# Patient Record
Sex: FEMALE | Race: WHITE | NOT HISPANIC OR LATINO
[De-identification: names, ages, dates, MRNs, and addresses within clinical notes are randomized per-mention and may not be internally consistent; named-entity substitution may affect disease eponyms.]

---

## 2017-01-06 ENCOUNTER — APPOINTMENT (OUTPATIENT)
Dept: PSYCHIATRY | Facility: CLINIC | Age: 27
End: 2017-01-06

## 2017-05-16 ENCOUNTER — RESULT REVIEW (OUTPATIENT)
Age: 27
End: 2017-05-16

## 2017-09-21 ENCOUNTER — APPOINTMENT (OUTPATIENT)
Dept: PSYCHIATRY | Facility: CLINIC | Age: 27
End: 2017-09-21
Payer: MEDICAID

## 2017-09-21 PROCEDURE — 90834 PSYTX W PT 45 MINUTES: CPT

## 2017-09-23 ENCOUNTER — APPOINTMENT (OUTPATIENT)
Dept: PSYCHIATRY | Facility: CLINIC | Age: 27
End: 2017-09-23
Payer: MEDICAID

## 2017-09-23 PROCEDURE — 90853 GROUP PSYCHOTHERAPY: CPT

## 2017-09-28 ENCOUNTER — APPOINTMENT (OUTPATIENT)
Dept: PSYCHIATRY | Facility: CLINIC | Age: 27
End: 2017-09-28
Payer: MEDICAID

## 2017-09-28 PROCEDURE — 90834 PSYTX W PT 45 MINUTES: CPT

## 2017-09-30 ENCOUNTER — APPOINTMENT (OUTPATIENT)
Dept: PSYCHIATRY | Facility: CLINIC | Age: 27
End: 2017-09-30
Payer: MEDICAID

## 2017-09-30 PROCEDURE — 90853 GROUP PSYCHOTHERAPY: CPT

## 2017-10-05 ENCOUNTER — APPOINTMENT (OUTPATIENT)
Dept: PSYCHIATRY | Facility: CLINIC | Age: 27
End: 2017-10-05
Payer: MEDICAID

## 2017-10-05 PROCEDURE — 90834 PSYTX W PT 45 MINUTES: CPT

## 2017-10-07 ENCOUNTER — APPOINTMENT (OUTPATIENT)
Dept: PSYCHIATRY | Facility: CLINIC | Age: 27
End: 2017-10-07
Payer: MEDICAID

## 2017-10-07 PROCEDURE — 90853 GROUP PSYCHOTHERAPY: CPT

## 2017-10-12 ENCOUNTER — APPOINTMENT (OUTPATIENT)
Dept: PSYCHIATRY | Facility: CLINIC | Age: 27
End: 2017-10-12
Payer: MEDICAID

## 2017-10-12 PROCEDURE — 90834 PSYTX W PT 45 MINUTES: CPT

## 2017-10-14 ENCOUNTER — APPOINTMENT (OUTPATIENT)
Dept: PSYCHIATRY | Facility: CLINIC | Age: 27
End: 2017-10-14
Payer: MEDICAID

## 2017-10-14 PROCEDURE — 90853 GROUP PSYCHOTHERAPY: CPT

## 2017-10-19 ENCOUNTER — APPOINTMENT (OUTPATIENT)
Dept: PSYCHIATRY | Facility: CLINIC | Age: 27
End: 2017-10-19

## 2017-10-24 ENCOUNTER — APPOINTMENT (OUTPATIENT)
Dept: PSYCHIATRY | Facility: CLINIC | Age: 27
End: 2017-10-24
Payer: COMMERCIAL

## 2017-10-24 PROCEDURE — 90834 PSYTX W PT 45 MINUTES: CPT

## 2017-10-28 ENCOUNTER — APPOINTMENT (OUTPATIENT)
Dept: PSYCHIATRY | Facility: CLINIC | Age: 27
End: 2017-10-28
Payer: COMMERCIAL

## 2017-10-28 PROCEDURE — 90853 GROUP PSYCHOTHERAPY: CPT

## 2017-11-04 ENCOUNTER — APPOINTMENT (OUTPATIENT)
Dept: PSYCHIATRY | Facility: CLINIC | Age: 27
End: 2017-11-04
Payer: COMMERCIAL

## 2017-11-04 PROCEDURE — 90853 GROUP PSYCHOTHERAPY: CPT

## 2017-11-04 PROCEDURE — 90834 PSYTX W PT 45 MINUTES: CPT | Mod: 59

## 2017-11-09 ENCOUNTER — APPOINTMENT (OUTPATIENT)
Dept: PSYCHIATRY | Facility: CLINIC | Age: 27
End: 2017-11-09
Payer: COMMERCIAL

## 2017-11-09 PROCEDURE — 90834 PSYTX W PT 45 MINUTES: CPT

## 2017-11-11 ENCOUNTER — APPOINTMENT (OUTPATIENT)
Dept: PSYCHIATRY | Facility: CLINIC | Age: 27
End: 2017-11-11
Payer: COMMERCIAL

## 2017-11-11 PROCEDURE — 90853 GROUP PSYCHOTHERAPY: CPT

## 2017-11-16 ENCOUNTER — APPOINTMENT (OUTPATIENT)
Dept: PSYCHIATRY | Facility: CLINIC | Age: 27
End: 2017-11-16
Payer: COMMERCIAL

## 2017-11-16 PROCEDURE — 90834 PSYTX W PT 45 MINUTES: CPT

## 2017-11-21 ENCOUNTER — APPOINTMENT (OUTPATIENT)
Dept: PSYCHIATRY | Facility: CLINIC | Age: 27
End: 2017-11-21
Payer: COMMERCIAL

## 2017-11-21 PROCEDURE — 90834 PSYTX W PT 45 MINUTES: CPT

## 2017-11-25 ENCOUNTER — APPOINTMENT (OUTPATIENT)
Dept: PSYCHIATRY | Facility: CLINIC | Age: 27
End: 2017-11-25
Payer: COMMERCIAL

## 2017-11-25 PROCEDURE — 90853 GROUP PSYCHOTHERAPY: CPT

## 2017-11-30 ENCOUNTER — APPOINTMENT (OUTPATIENT)
Dept: PSYCHIATRY | Facility: CLINIC | Age: 27
End: 2017-11-30
Payer: COMMERCIAL

## 2017-11-30 PROCEDURE — 90834 PSYTX W PT 45 MINUTES: CPT

## 2017-12-02 ENCOUNTER — APPOINTMENT (OUTPATIENT)
Dept: PSYCHIATRY | Facility: CLINIC | Age: 27
End: 2017-12-02
Payer: COMMERCIAL

## 2017-12-02 PROCEDURE — 90853 GROUP PSYCHOTHERAPY: CPT

## 2017-12-09 ENCOUNTER — APPOINTMENT (OUTPATIENT)
Dept: PSYCHIATRY | Facility: CLINIC | Age: 27
End: 2017-12-09
Payer: COMMERCIAL

## 2017-12-09 PROCEDURE — 90853 GROUP PSYCHOTHERAPY: CPT

## 2017-12-09 PROCEDURE — 90834 PSYTX W PT 45 MINUTES: CPT | Mod: 59

## 2017-12-14 ENCOUNTER — APPOINTMENT (OUTPATIENT)
Dept: PSYCHIATRY | Facility: CLINIC | Age: 27
End: 2017-12-14
Payer: COMMERCIAL

## 2017-12-14 PROCEDURE — 90834 PSYTX W PT 45 MINUTES: CPT

## 2017-12-23 ENCOUNTER — APPOINTMENT (OUTPATIENT)
Dept: PSYCHIATRY | Facility: CLINIC | Age: 27
End: 2017-12-23
Payer: MEDICAID

## 2017-12-23 ENCOUNTER — APPOINTMENT (OUTPATIENT)
Dept: PSYCHIATRY | Facility: CLINIC | Age: 27
End: 2017-12-23
Payer: COMMERCIAL

## 2017-12-23 PROCEDURE — 90853 GROUP PSYCHOTHERAPY: CPT

## 2017-12-23 PROCEDURE — 90834 PSYTX W PT 45 MINUTES: CPT

## 2017-12-30 ENCOUNTER — APPOINTMENT (OUTPATIENT)
Dept: PSYCHIATRY | Facility: CLINIC | Age: 27
End: 2017-12-30
Payer: COMMERCIAL

## 2017-12-30 PROCEDURE — 90853 GROUP PSYCHOTHERAPY: CPT

## 2017-12-30 PROCEDURE — 90834 PSYTX W PT 45 MINUTES: CPT | Mod: 59

## 2018-01-06 ENCOUNTER — APPOINTMENT (OUTPATIENT)
Dept: PSYCHIATRY | Facility: CLINIC | Age: 28
End: 2018-01-06
Payer: COMMERCIAL

## 2018-01-06 PROCEDURE — 90834 PSYTX W PT 45 MINUTES: CPT | Mod: 59

## 2018-01-06 PROCEDURE — 90853 GROUP PSYCHOTHERAPY: CPT

## 2018-01-13 ENCOUNTER — APPOINTMENT (OUTPATIENT)
Dept: PSYCHIATRY | Facility: CLINIC | Age: 28
End: 2018-01-13
Payer: COMMERCIAL

## 2018-01-13 PROCEDURE — 90834 PSYTX W PT 45 MINUTES: CPT

## 2018-01-20 ENCOUNTER — APPOINTMENT (OUTPATIENT)
Dept: PSYCHIATRY | Facility: CLINIC | Age: 28
End: 2018-01-20
Payer: COMMERCIAL

## 2018-01-20 PROCEDURE — 90853 GROUP PSYCHOTHERAPY: CPT

## 2018-01-27 ENCOUNTER — APPOINTMENT (OUTPATIENT)
Dept: PSYCHIATRY | Facility: CLINIC | Age: 28
End: 2018-01-27
Payer: COMMERCIAL

## 2018-01-27 PROCEDURE — 90853 GROUP PSYCHOTHERAPY: CPT

## 2018-01-27 PROCEDURE — 90834 PSYTX W PT 45 MINUTES: CPT | Mod: 59

## 2018-02-01 ENCOUNTER — APPOINTMENT (OUTPATIENT)
Dept: PSYCHIATRY | Facility: CLINIC | Age: 28
End: 2018-02-01
Payer: COMMERCIAL

## 2018-02-01 PROCEDURE — 90834 PSYTX W PT 45 MINUTES: CPT

## 2018-02-08 ENCOUNTER — APPOINTMENT (OUTPATIENT)
Dept: PSYCHIATRY | Facility: CLINIC | Age: 28
End: 2018-02-08
Payer: COMMERCIAL

## 2018-02-08 PROCEDURE — 90834 PSYTX W PT 45 MINUTES: CPT

## 2018-02-10 ENCOUNTER — APPOINTMENT (OUTPATIENT)
Dept: PSYCHIATRY | Facility: CLINIC | Age: 28
End: 2018-02-10
Payer: COMMERCIAL

## 2018-02-10 PROCEDURE — 90853 GROUP PSYCHOTHERAPY: CPT

## 2018-02-17 ENCOUNTER — APPOINTMENT (OUTPATIENT)
Dept: PSYCHIATRY | Facility: CLINIC | Age: 28
End: 2018-02-17
Payer: COMMERCIAL

## 2018-02-17 PROCEDURE — 90853 GROUP PSYCHOTHERAPY: CPT

## 2018-02-17 PROCEDURE — 90834 PSYTX W PT 45 MINUTES: CPT | Mod: 59

## 2018-02-24 ENCOUNTER — APPOINTMENT (OUTPATIENT)
Dept: PSYCHIATRY | Facility: CLINIC | Age: 28
End: 2018-02-24
Payer: COMMERCIAL

## 2018-02-24 PROCEDURE — 90837 PSYTX W PT 60 MINUTES: CPT | Mod: 59

## 2018-02-24 PROCEDURE — 90853 GROUP PSYCHOTHERAPY: CPT

## 2018-03-03 ENCOUNTER — APPOINTMENT (OUTPATIENT)
Dept: PSYCHIATRY | Facility: CLINIC | Age: 28
End: 2018-03-03
Payer: COMMERCIAL

## 2018-03-03 PROCEDURE — 90837 PSYTX W PT 60 MINUTES: CPT

## 2018-03-03 PROCEDURE — 90853 GROUP PSYCHOTHERAPY: CPT

## 2018-03-03 PROCEDURE — 90837 PSYTX W PT 60 MINUTES: CPT | Mod: 59

## 2018-03-17 ENCOUNTER — APPOINTMENT (OUTPATIENT)
Dept: PSYCHIATRY | Facility: CLINIC | Age: 28
End: 2018-03-17
Payer: COMMERCIAL

## 2018-03-17 PROCEDURE — 90834 PSYTX W PT 45 MINUTES: CPT | Mod: 59

## 2018-03-17 PROCEDURE — 90853 GROUP PSYCHOTHERAPY: CPT

## 2018-03-24 ENCOUNTER — APPOINTMENT (OUTPATIENT)
Dept: PSYCHIATRY | Facility: CLINIC | Age: 28
End: 2018-03-24
Payer: COMMERCIAL

## 2018-03-24 PROCEDURE — 90834 PSYTX W PT 45 MINUTES: CPT

## 2018-03-31 ENCOUNTER — APPOINTMENT (OUTPATIENT)
Dept: PSYCHIATRY | Facility: CLINIC | Age: 28
End: 2018-03-31
Payer: COMMERCIAL

## 2018-03-31 PROCEDURE — 90853 GROUP PSYCHOTHERAPY: CPT

## 2018-03-31 PROCEDURE — 90837 PSYTX W PT 60 MINUTES: CPT | Mod: 59

## 2018-04-10 ENCOUNTER — APPOINTMENT (OUTPATIENT)
Dept: PSYCHIATRY | Facility: CLINIC | Age: 28
End: 2018-04-10
Payer: COMMERCIAL

## 2018-04-10 PROCEDURE — 90837 PSYTX W PT 60 MINUTES: CPT

## 2018-04-18 ENCOUNTER — APPOINTMENT (OUTPATIENT)
Dept: PSYCHIATRY | Facility: CLINIC | Age: 28
End: 2018-04-18

## 2018-04-25 ENCOUNTER — APPOINTMENT (OUTPATIENT)
Dept: PSYCHIATRY | Facility: CLINIC | Age: 28
End: 2018-04-25
Payer: COMMERCIAL

## 2018-04-25 PROCEDURE — 90834 PSYTX W PT 45 MINUTES: CPT

## 2018-05-09 ENCOUNTER — APPOINTMENT (OUTPATIENT)
Dept: PSYCHIATRY | Facility: CLINIC | Age: 28
End: 2018-05-09
Payer: COMMERCIAL

## 2018-05-09 PROCEDURE — 90834 PSYTX W PT 45 MINUTES: CPT

## 2018-05-12 ENCOUNTER — APPOINTMENT (OUTPATIENT)
Dept: PSYCHIATRY | Facility: CLINIC | Age: 28
End: 2018-05-12
Payer: COMMERCIAL

## 2018-05-12 PROCEDURE — 90853 GROUP PSYCHOTHERAPY: CPT

## 2018-05-14 ENCOUNTER — EMERGENCY (EMERGENCY)
Facility: HOSPITAL | Age: 28
LOS: 1 days | End: 2018-05-14
Payer: MEDICAID

## 2018-05-14 PROCEDURE — 99284 EMERGENCY DEPT VISIT MOD MDM: CPT | Mod: 25

## 2018-05-19 ENCOUNTER — APPOINTMENT (OUTPATIENT)
Dept: PSYCHIATRY | Facility: CLINIC | Age: 28
End: 2018-05-19
Payer: COMMERCIAL

## 2018-05-19 PROCEDURE — 90853 GROUP PSYCHOTHERAPY: CPT

## 2018-06-06 ENCOUNTER — RESULT REVIEW (OUTPATIENT)
Age: 28
End: 2018-06-06

## 2018-06-23 ENCOUNTER — APPOINTMENT (OUTPATIENT)
Dept: PSYCHIATRY | Facility: CLINIC | Age: 28
End: 2018-06-23

## 2018-06-26 ENCOUNTER — APPOINTMENT (OUTPATIENT)
Dept: PSYCHIATRY | Facility: CLINIC | Age: 28
End: 2018-06-26
Payer: COMMERCIAL

## 2018-06-26 PROCEDURE — 90837 PSYTX W PT 60 MINUTES: CPT

## 2018-07-03 ENCOUNTER — APPOINTMENT (OUTPATIENT)
Dept: PSYCHIATRY | Facility: CLINIC | Age: 28
End: 2018-07-03
Payer: COMMERCIAL

## 2018-07-03 PROCEDURE — 90837 PSYTX W PT 60 MINUTES: CPT

## 2018-07-14 ENCOUNTER — APPOINTMENT (OUTPATIENT)
Dept: PSYCHIATRY | Facility: CLINIC | Age: 28
End: 2018-07-14
Payer: COMMERCIAL

## 2018-07-14 PROCEDURE — 90837 PSYTX W PT 60 MINUTES: CPT

## 2018-07-31 ENCOUNTER — APPOINTMENT (OUTPATIENT)
Dept: PSYCHIATRY | Facility: CLINIC | Age: 28
End: 2018-07-31
Payer: COMMERCIAL

## 2018-07-31 PROCEDURE — 90837 PSYTX W PT 60 MINUTES: CPT

## 2018-09-01 ENCOUNTER — APPOINTMENT (OUTPATIENT)
Dept: PSYCHIATRY | Facility: CLINIC | Age: 28
End: 2018-09-01
Payer: COMMERCIAL

## 2018-09-01 PROCEDURE — 90837 PSYTX W PT 60 MINUTES: CPT

## 2018-09-04 ENCOUNTER — APPOINTMENT (OUTPATIENT)
Dept: PSYCHIATRY | Facility: CLINIC | Age: 28
End: 2018-09-04
Payer: COMMERCIAL

## 2018-09-04 PROCEDURE — 90837 PSYTX W PT 60 MINUTES: CPT

## 2018-09-18 ENCOUNTER — APPOINTMENT (OUTPATIENT)
Dept: PSYCHIATRY | Facility: CLINIC | Age: 28
End: 2018-09-18

## 2018-10-02 ENCOUNTER — APPOINTMENT (OUTPATIENT)
Dept: PSYCHIATRY | Facility: CLINIC | Age: 28
End: 2018-10-02
Payer: COMMERCIAL

## 2018-10-02 PROCEDURE — 90837 PSYTX W PT 60 MINUTES: CPT

## 2018-10-10 ENCOUNTER — APPOINTMENT (OUTPATIENT)
Dept: PSYCHIATRY | Facility: CLINIC | Age: 28
End: 2018-10-10

## 2018-10-10 ENCOUNTER — APPOINTMENT (OUTPATIENT)
Dept: PSYCHIATRY | Facility: CLINIC | Age: 28
End: 2018-10-10
Payer: COMMERCIAL

## 2018-10-10 PROCEDURE — 90832 PSYTX W PT 30 MINUTES: CPT

## 2018-10-13 ENCOUNTER — APPOINTMENT (OUTPATIENT)
Dept: PSYCHIATRY | Facility: CLINIC | Age: 28
End: 2018-10-13
Payer: COMMERCIAL

## 2018-10-13 PROCEDURE — 90837 PSYTX W PT 60 MINUTES: CPT

## 2018-10-20 ENCOUNTER — APPOINTMENT (OUTPATIENT)
Dept: PSYCHIATRY | Facility: CLINIC | Age: 28
End: 2018-10-20

## 2018-10-23 ENCOUNTER — APPOINTMENT (OUTPATIENT)
Dept: PSYCHIATRY | Facility: CLINIC | Age: 28
End: 2018-10-23
Payer: COMMERCIAL

## 2018-10-23 ENCOUNTER — APPOINTMENT (OUTPATIENT)
Dept: ENDOCRINOLOGY | Facility: CLINIC | Age: 28
End: 2018-10-23
Payer: MEDICAID

## 2018-10-23 VITALS
BODY MASS INDEX: 25.52 KG/M2 | DIASTOLIC BLOOD PRESSURE: 62 MMHG | HEART RATE: 82 BPM | WEIGHT: 144 LBS | HEIGHT: 63 IN | SYSTOLIC BLOOD PRESSURE: 102 MMHG | OXYGEN SATURATION: 98 %

## 2018-10-23 DIAGNOSIS — R63.5 ABNORMAL WEIGHT GAIN: ICD-10-CM

## 2018-10-23 DIAGNOSIS — Z82.49 FAMILY HISTORY OF ISCHEMIC HEART DISEASE AND OTHER DISEASES OF THE CIRCULATORY SYSTEM: ICD-10-CM

## 2018-10-23 DIAGNOSIS — Z87.09 PERSONAL HISTORY OF OTHER DISEASES OF THE RESPIRATORY SYSTEM: ICD-10-CM

## 2018-10-23 DIAGNOSIS — L70.9 ACNE, UNSPECIFIED: ICD-10-CM

## 2018-10-23 PROCEDURE — 99204 OFFICE O/P NEW MOD 45 MIN: CPT

## 2018-10-23 PROCEDURE — 90837 PSYTX W PT 60 MINUTES: CPT

## 2018-10-24 ENCOUNTER — APPOINTMENT (OUTPATIENT)
Dept: PSYCHIATRY | Facility: CLINIC | Age: 28
End: 2018-10-24

## 2018-11-16 ENCOUNTER — APPOINTMENT (OUTPATIENT)
Dept: PSYCHIATRY | Facility: CLINIC | Age: 28
End: 2018-11-16
Payer: COMMERCIAL

## 2018-11-16 PROCEDURE — 90837 PSYTX W PT 60 MINUTES: CPT

## 2018-11-28 ENCOUNTER — APPOINTMENT (OUTPATIENT)
Dept: PSYCHIATRY | Facility: CLINIC | Age: 28
End: 2018-11-28
Payer: COMMERCIAL

## 2018-11-28 PROCEDURE — 90837 PSYTX W PT 60 MINUTES: CPT

## 2018-12-15 ENCOUNTER — APPOINTMENT (OUTPATIENT)
Dept: PSYCHIATRY | Facility: CLINIC | Age: 28
End: 2018-12-15

## 2019-01-16 ENCOUNTER — APPOINTMENT (OUTPATIENT)
Dept: PSYCHIATRY | Facility: CLINIC | Age: 29
End: 2019-01-16
Payer: COMMERCIAL

## 2019-01-16 PROCEDURE — 90837 PSYTX W PT 60 MINUTES: CPT

## 2019-01-18 ENCOUNTER — APPOINTMENT (OUTPATIENT)
Dept: RHEUMATOLOGY | Facility: CLINIC | Age: 29
End: 2019-01-18

## 2019-01-21 ENCOUNTER — APPOINTMENT (OUTPATIENT)
Dept: CARDIOLOGY | Facility: CLINIC | Age: 29
End: 2019-01-21
Payer: COMMERCIAL

## 2019-01-21 ENCOUNTER — NON-APPOINTMENT (OUTPATIENT)
Age: 29
End: 2019-01-21

## 2019-01-21 VITALS
WEIGHT: 139 LBS | HEART RATE: 83 BPM | DIASTOLIC BLOOD PRESSURE: 81 MMHG | SYSTOLIC BLOOD PRESSURE: 118 MMHG | RESPIRATION RATE: 16 BRPM | HEIGHT: 63 IN | BODY MASS INDEX: 24.63 KG/M2

## 2019-01-21 DIAGNOSIS — R06.09 OTHER FORMS OF DYSPNEA: ICD-10-CM

## 2019-01-21 DIAGNOSIS — Z78.9 OTHER SPECIFIED HEALTH STATUS: ICD-10-CM

## 2019-01-21 DIAGNOSIS — Z87.898 PERSONAL HISTORY OF OTHER SPECIFIED CONDITIONS: ICD-10-CM

## 2019-01-21 PROCEDURE — 93000 ELECTROCARDIOGRAM COMPLETE: CPT

## 2019-01-21 PROCEDURE — 99244 OFF/OP CNSLTJ NEW/EST MOD 40: CPT

## 2019-01-21 RX ORDER — DEXAMETHASONE 1 MG/1
1 TABLET ORAL
Qty: 1 | Refills: 0 | Status: DISCONTINUED | COMMUNITY
Start: 2018-10-23 | End: 2019-01-21

## 2019-01-21 NOTE — DISCUSSION/SUMMARY
[FreeTextEntry1] : 1. Echocardiogram and 30 day event monitor to rule out cardiac causes including arrhythmia as cause of her syncope, presyncope and dizziness.\par 2. If these are unremarkable no additional cardiac testing or treatment at this time.\par 3. If testing is unremarkable there would be no cardiac contraindication to any surgery if this was planned or other intervention for the adenoma.\par 4. Will obtain most recent bloodwork.\par 5. Follow up here in six weeks.

## 2019-01-21 NOTE — ASSESSMENT
[FreeTextEntry1] : EKG: Sinus rhythm with no significant ST or T wave changes.\par \par 28-year-old female with no significant past medical history who was recently diagnosed with a pituitary adenoma and presents to me for evaluation of syncope, presyncope and dizziness. It is most likely that her symptoms of syncope, presyncope and dizziness all related to the adenoma. A cardiac cause would appear less likely. At this time I would rule this out however with an echocardiogram and monitoring to ensure that there is no evidence of arrhythmia. Her dyspnea on exertion may be related to the change in weather and I would certainly doubt a cardiac cause of that if her echocardiogram is unremarkable.

## 2019-01-21 NOTE — HISTORY OF PRESENT ILLNESS
[FreeTextEntry1] : The patient comes to the office today primarily because of episodes of syncope and near-syncope. The episodes of syncope occurred in the middle of last year, last in May of 2018. She reports continued episodes with near syncope, most recently a week ago, occurring generally every 2 weeks. She has frequent if not constant dizziness. She also reports more recently some mild shortness of breath on exertion and is wondering if this is related to her prior asthma and the change in weather. She notes she occasionally gets a sensation of rushing in her chest but no clear palpitations. She was diagnosed with possibly a pituitary microadenoma as a potential cause of her symptoms with regards to dizziness and syncope. Patient denies chest pain, shortness of breath, orthopnea.

## 2019-01-25 ENCOUNTER — APPOINTMENT (OUTPATIENT)
Dept: PSYCHIATRY | Facility: CLINIC | Age: 29
End: 2019-01-25
Payer: COMMERCIAL

## 2019-01-25 PROCEDURE — 90837 PSYTX W PT 60 MINUTES: CPT

## 2019-02-01 ENCOUNTER — APPOINTMENT (OUTPATIENT)
Dept: PSYCHIATRY | Facility: CLINIC | Age: 29
End: 2019-02-01
Payer: COMMERCIAL

## 2019-02-01 PROCEDURE — 90847 FAMILY PSYTX W/PT 50 MIN: CPT

## 2019-02-06 ENCOUNTER — APPOINTMENT (OUTPATIENT)
Dept: CARDIOLOGY | Facility: CLINIC | Age: 29
End: 2019-02-06

## 2019-02-19 ENCOUNTER — APPOINTMENT (OUTPATIENT)
Dept: PSYCHIATRY | Facility: CLINIC | Age: 29
End: 2019-02-19

## 2019-03-05 ENCOUNTER — LABORATORY RESULT (OUTPATIENT)
Age: 29
End: 2019-03-05

## 2019-03-05 ENCOUNTER — APPOINTMENT (OUTPATIENT)
Dept: PSYCHIATRY | Facility: CLINIC | Age: 29
End: 2019-03-05
Payer: COMMERCIAL

## 2019-03-05 ENCOUNTER — APPOINTMENT (OUTPATIENT)
Dept: RHEUMATOLOGY | Facility: CLINIC | Age: 29
End: 2019-03-05
Payer: COMMERCIAL

## 2019-03-05 VITALS
OXYGEN SATURATION: 99 % | HEART RATE: 86 BPM | HEIGHT: 62.5 IN | WEIGHT: 130 LBS | SYSTOLIC BLOOD PRESSURE: 104 MMHG | RESPIRATION RATE: 17 BRPM | DIASTOLIC BLOOD PRESSURE: 70 MMHG | BODY MASS INDEX: 23.33 KG/M2 | TEMPERATURE: 98.1 F

## 2019-03-05 DIAGNOSIS — Z86.39 PERSONAL HISTORY OF OTHER ENDOCRINE, NUTRITIONAL AND METABOLIC DISEASE: ICD-10-CM

## 2019-03-05 PROCEDURE — 36415 COLL VENOUS BLD VENIPUNCTURE: CPT

## 2019-03-05 PROCEDURE — 99245 OFF/OP CONSLTJ NEW/EST HI 55: CPT | Mod: 25

## 2019-03-05 PROCEDURE — 90837 PSYTX W PT 60 MINUTES: CPT

## 2019-03-05 NOTE — ASSESSMENT
[FreeTextEntry1] : 28 year old female was referred for abnormal serologies, including (+)centromere AB and (+)proteinase-3 AB.  She does not exhibit any obvious signs/symptoms of scleroderma/CREST, ANCA-vasculitis, or other connective tissue disorders at this time.  I have therefore ordered some more bloodwork as further workup.  In addition, as she has been experiencing dyspnea, I have ordered a chest x-ray and recommended that she see a pulmonologist.  She will also follow up with her gastroenterologist regarding her dysphagia.  She will follow up with me again in 3-4 weeks  to review her results.\par

## 2019-03-05 NOTE — PHYSICAL EXAM
[General Appearance - Alert] : alert [General Appearance - In No Acute Distress] : in no acute distress [Sclera] : the sclera and conjunctiva were normal [Outer Ear] : the ears and nose were normal in appearance [Oropharynx] : the oropharynx was normal [Neck Appearance] : the appearance of the neck was normal [Neck Cervical Mass (___cm)] : no neck mass was observed [Jugular Venous Distention Increased] : there was no jugular-venous distention [Thyroid Diffuse Enlargement] : the thyroid was not enlarged [Thyroid Nodule] : there were no palpable thyroid nodules [Auscultation Breath Sounds / Voice Sounds] : lungs were clear to auscultation bilaterally [Heart Rate And Rhythm] : heart rate was normal and rhythm regular [Heart Sounds] : normal S1 and S2 [Heart Sounds Gallop] : no gallops [Murmurs] : no murmurs [Heart Sounds Pericardial Friction Rub] : no pericardial rub [Edema] : there was no peripheral edema [Bowel Sounds] : normal bowel sounds [Abdomen Soft] : soft [Abdomen Tenderness] : non-tender [Abdomen Mass (___ Cm)] : no abdominal mass palpated [Cervical Lymph Nodes Enlarged Posterior Bilaterally] : posterior cervical [Cervical Lymph Nodes Enlarged Anterior Bilaterally] : anterior cervical [Supraclavicular Lymph Nodes Enlarged Bilaterally] : supraclavicular [No Spinal Tenderness] : no spinal tenderness [FreeTextEntry1] : No synovitis, full ROM in all joints\par  [Skin Color & Pigmentation] : normal skin color and pigmentation [Skin Turgor] : normal skin turgor [] : no rash [No Focal Deficits] : no focal deficits [Oriented To Time, Place, And Person] : oriented to person, place, and time [Impaired Insight] : insight and judgment were intact [Affect] : the affect was normal

## 2019-03-05 NOTE — HISTORY OF PRESENT ILLNESS
[FreeTextEntry1] : 28 year old female with PMHx as listed below reports that in 10/2017, she began to feel off-balance.  Several months later, she began to experience syncopal episodes, which was attributed to episodes of hypotension. In 6/2018, she began to experience lactation, which was diagnosed as a pituitary adenoma.  She was sent for a brain MRI, which revealed the adenoma, as well as other "hyper-resonant" areas.  She followed up with her PMD, who ordered bloodwork, revealing (+)MICK and (+)centromere AB.  For the past several months, she has been experiencing dyspnea.  She says it's worse with exertion, but occurs at times even at rest.  It occurs approximately once per week, lasting for several hours at a time. She also reports that she has been experiencing difficulty swallowing pills - she is able to swallow food, but has to chew it up very well.  \par She denies any sclerodactyly or Raynaud's symptoms.\par No F/C, (+) weight loss, (+) night sweats, no oral ulcers, no rashes, no joint pains, no alopecia, no photosensitivity, no dry eyes/dry mouth, no focal weakness, no dysphagia\par \par   [Anorexia] : no anorexia [Weight Loss] : no weight loss [Malaise] : no malaise [Fever] : no fever [Chills] : no chills [Fatigue] : no fatigue [Malar Facial Rash] : no malar facial rash [Skin Lesions] : no lesions [Skin Nodules] : no skin nodules [Oral Ulcers] : no oral ulcers [Cough] : no cough [Dry Mouth] : no dry mouth [Dysphagia] : no dysphagia [Shortness of Breath] : no shortness of breath [Chest Pain] : no chest pain [Arthralgias] : no arthralgias [Joint Swelling] : no joint swelling [Joint Warmth] : no joint warmth [Joint Deformity] : no joint deformity [Decreased ROM] : no decreased range of motion [Morning Stiffness] : no morning stiffness [Falls] : no falls [Difficulty Standing] : no difficulty standing [Difficulty Walking] : no difficulty walking [Dyspnea] : no dyspnea [Myalgias] : no myalgias [Muscle Weakness] : no muscle weakness [Muscle Spasms] : no muscle spasms [Muscle Cramping] : no muscle cramping [Visual Changes] : no visual changes [Eye Pain] : no eye pain [Eye Redness] : no eye redness [Dry Eyes] : no dry eyes

## 2019-03-05 NOTE — CONSULT LETTER
[Dear  ___] : Dear  [unfilled], [Consult Letter:] : I had the pleasure of evaluating your patient, [unfilled]. [Please see my note below.] : Please see my note below. [Consult Closing:] : Thank you very much for allowing me to participate in the care of this patient.  If you have any questions, please do not hesitate to contact me. [Sincerely,] : Sincerely, [FreeTextEntry3] : Maikol Shearer MD\par Rheumatology\par [facility]\par  of Medicine\par [employer] [facility] \par \par [address]\[place] [address]\par phone:  [phone]\par fax:      [phone]\par \par [address] [place] [address]\[place] [address]\par phone:  [phone]\par fax:      [phone]\par

## 2019-03-06 LAB
ALBUMIN SERPL ELPH-MCNC: 4.7 G/DL
ALP BLD-CCNC: 42 U/L
ALT SERPL-CCNC: 14 U/L
ANION GAP SERPL CALC-SCNC: 14 MMOL/L
AST SERPL-CCNC: 17 U/L
BASOPHILS # BLD AUTO: 0.05 K/UL
BASOPHILS NFR BLD AUTO: 0.7 %
BILIRUB SERPL-MCNC: 1 MG/DL
BUN SERPL-MCNC: 6 MG/DL
C3 SERPL-MCNC: 114 MG/DL
C4 SERPL-MCNC: 18 MG/DL
CALCIUM SERPL-MCNC: 10.1 MG/DL
CENTROMERE IGG SER-ACNC: >8 CD:130001892
CHLORIDE SERPL-SCNC: 103 MMOL/L
CHOLEST SERPL-MCNC: 175 MG/DL
CHOLEST/HDLC SERPL: 3.1 RATIO
CO2 SERPL-SCNC: 25 MMOL/L
CREAT SERPL-MCNC: 0.74 MG/DL
CREAT SPEC-SCNC: 345 MG/DL
CREAT/PROT UR: 0.1 RATIO
CRP SERPL-MCNC: <0.1 MG/DL
ENA RNP AB SER IA-ACNC: <0.2 AL
ENA SCL70 IGG SER IA-ACNC: <0.2 AL
ENA SM AB SER IA-ACNC: <0.2 AL
ENA SS-A AB SER IA-ACNC: <0.2 AL
ENA SS-B AB SER IA-ACNC: <0.2 AL
EOSINOPHIL # BLD AUTO: 0.13 K/UL
EOSINOPHIL NFR BLD AUTO: 1.9 %
ERYTHROCYTE [SEDIMENTATION RATE] IN BLOOD BY WESTERGREN METHOD: 9 MM/HR
GLUCOSE SERPL-MCNC: 80 MG/DL
HCT VFR BLD CALC: 46.4 %
HDLC SERPL-MCNC: 57 MG/DL
HGB BLD-MCNC: 14.6 G/DL
IMM GRANULOCYTES NFR BLD AUTO: 0.1 %
LDLC SERPL CALC-MCNC: 100 MG/DL
LYMPHOCYTES # BLD AUTO: 2.02 K/UL
LYMPHOCYTES NFR BLD AUTO: 29.8 %
MAN DIFF?: NORMAL
MCHC RBC-ENTMCNC: 29 PG
MCHC RBC-ENTMCNC: 31.5 GM/DL
MCV RBC AUTO: 92.1 FL
MONOCYTES # BLD AUTO: 0.38 K/UL
MONOCYTES NFR BLD AUTO: 5.6 %
MPO AB + PR3 PNL SER: NORMAL
NEUTROPHILS # BLD AUTO: 4.18 K/UL
NEUTROPHILS NFR BLD AUTO: 61.9 %
PLATELET # BLD AUTO: 271 K/UL
POTASSIUM SERPL-SCNC: 4.5 MMOL/L
PROT SERPL-MCNC: 7.1 G/DL
PROT UR-MCNC: 19 MG/DL
RBC # BLD: 5.04 M/UL
RBC # FLD: 12.3 %
SODIUM SERPL-SCNC: 142 MMOL/L
TRIGL SERPL-MCNC: 89 MG/DL
WBC # FLD AUTO: 6.77 K/UL

## 2019-03-08 LAB
ANA PAT FLD IF-IMP: ABNORMAL
ANA SER IF-ACNC: ABNORMAL
APPEARANCE: ABNORMAL
BACTERIA: ABNORMAL
BILIRUBIN URINE: NEGATIVE
BLOOD URINE: NEGATIVE
COLOR: ABNORMAL
DSDNA AB SER-ACNC: <12 IU/ML
GLUCOSE QUALITATIVE U: NEGATIVE
HBA1C MFR BLD HPLC: 4.9 %
HYALINE CASTS: 0 /LPF
KETONES URINE: NORMAL
LEUKOCYTE ESTERASE URINE: NEGATIVE
MICROSCOPIC-UA: NORMAL
MYELOPEROXIDASE AB SER QL IA: <5 UNITS
MYELOPEROXIDASE CELLS FLD QL: NEGATIVE
NITRITE URINE: NEGATIVE
PH URINE: 5.5
PROTEIN URINE: NORMAL
PROTEINASE3 AB SER IA-ACNC: 5.2 UNITS
PROTEINASE3 AB SER-ACNC: NEGATIVE
RED BLOOD CELLS URINE: 0 /HPF
SPECIFIC GRAVITY URINE: 1.03
SQUAMOUS EPITHELIAL CELLS: 4 /HPF
THYROGLOB AB SERPL-ACNC: <20 IU/ML
THYROPEROXIDASE AB SERPL IA-ACNC: <10 IU/ML
URINE COMMENTS: NORMAL
UROBILINOGEN URINE: NORMAL
WHITE BLOOD CELLS URINE: 7 /HPF

## 2019-03-11 ENCOUNTER — RESULT REVIEW (OUTPATIENT)
Age: 29
End: 2019-03-11

## 2019-03-11 LAB — RNA POLYMERASE III IGG: <10 U

## 2019-03-13 ENCOUNTER — APPOINTMENT (OUTPATIENT)
Dept: PSYCHIATRY | Facility: CLINIC | Age: 29
End: 2019-03-13
Payer: COMMERCIAL

## 2019-03-13 ENCOUNTER — APPOINTMENT (OUTPATIENT)
Dept: CARDIOLOGY | Facility: CLINIC | Age: 29
End: 2019-03-13
Payer: COMMERCIAL

## 2019-03-13 PROCEDURE — 90837 PSYTX W PT 60 MINUTES: CPT

## 2019-03-13 PROCEDURE — 93306 TTE W/DOPPLER COMPLETE: CPT

## 2019-03-20 ENCOUNTER — TRANSCRIPTION ENCOUNTER (OUTPATIENT)
Age: 29
End: 2019-03-20

## 2019-03-20 ENCOUNTER — APPOINTMENT (OUTPATIENT)
Dept: RHEUMATOLOGY | Facility: CLINIC | Age: 29
End: 2019-03-20
Payer: COMMERCIAL

## 2019-03-20 ENCOUNTER — APPOINTMENT (OUTPATIENT)
Dept: RHEUMATOLOGY | Facility: CLINIC | Age: 29
End: 2019-03-20

## 2019-03-20 VITALS
DIASTOLIC BLOOD PRESSURE: 77 MMHG | TEMPERATURE: 98.1 F | HEIGHT: 62.5 IN | HEART RATE: 80 BPM | SYSTOLIC BLOOD PRESSURE: 118 MMHG | BODY MASS INDEX: 22.97 KG/M2 | WEIGHT: 128 LBS | OXYGEN SATURATION: 99 %

## 2019-03-20 PROCEDURE — 99215 OFFICE O/P EST HI 40 MIN: CPT

## 2019-03-20 NOTE — DATA REVIEWED
[FreeTextEntry1] : Labs reviewed with patient. \par \par TTE (03/2019): reviewed in [facility] \par MRI Brain (10/2018): reviewed in [facility]\par \par

## 2019-03-20 NOTE — ASSESSMENT
[FreeTextEntry1] : 28 year old female here for second opinion for abnormal serologies - positive MICK 1:2560 centromere pattern and positive centromere AB (>8) and transiently (+)proteinase-3 AB.  \par \par 1. Positive centromere antibody: c/o dysphagias and exertional dyspnea which is currently being addressed. Referred for GI w/u including EGD as well as PFTs to evaluate her SOB. Otherwise denies any skin changes, Raynaud's. arthralgias etc. No clinical signs related to scleroderma at this time  i.e. telangiectasia, digital ulcers, NFC changes, synovitis, sclerodactyly. ECHO reviewed - was relatively normal with preserved function. Pending Holter results. Reassured patient that she does not have signs or symptoms suggestive of systemic sclerosis at this time. However, will need to complete baseline evaluation given her symptoms and will need close monitoring given high titer antibodies.   \par \par 2. Positive PR3 antibodies: subsequently negative. Initially was positive in low titers - likely artifact/false positive. No signs or symptoms of vasculitis at this time. \par \par 3. Dysphagia: a/w chronic diarrhea. Advised dietary modification. F/u GI to complete work up - may need EGD and/ or capsule endoscopy to evaluate her bloody diarrhea since colonoscopy was recently normal per patient. \par \par 4. Dizziness: referred for ENT evaluation. ? POTS versus orthostatic hypotension given h/o orthostatic symptoms a/w tachycardia in the 140s. F/u cardiology re: holter results. May also need neuro eval if ENT evaluation is normal. \par \par 5. Exertional dyspnea: referred for PFTs. If abnormal, will need CT chest and pulmonology evaluation.\par \par F/u with Dr. Shearer as scheduled. \par

## 2019-03-20 NOTE — REVIEW OF SYSTEMS
[Negative] : Heme/Lymph [Shortness Of Breath] : shortness of breath [SOB on Exertion] : shortness of breath during exertion [As Noted in HPI] : as noted in HPI [Diarrhea] : diarrhea [Wheezing] : no wheezing [Cough] : no cough [Abdominal Pain] : no abdominal pain [Constipation] : no constipation [Vomiting] : no vomiting [Heartburn] : no heartburn [Melena] : no melena

## 2019-03-20 NOTE — HISTORY OF PRESENT ILLNESS
[___ Week(s) Ago] : [unfilled] week(s) ago [Currently Experiencing] : currently [Anorexia] : no anorexia [Weight Loss] : no weight loss [Malaise] : no malaise [Chills] : no chills [Fever] : no fever [Fatigue] : no fatigue [Depression] : no depression [Malar Facial Rash] : no malar facial rash [Skin Lesions] : no lesions [Skin Nodules] : no skin nodules [Cough] : no cough [Oral Ulcers] : no oral ulcers [Dry Mouth] : no dry mouth [Dysphagia] : no dysphagia [Dysphonia] : no dysphonia [Shortness of Breath] : no shortness of breath [Chest Pain] : no chest pain [Arthralgias] : no arthralgias [Joint Warmth] : no joint warmth [Joint Swelling] : no joint swelling [Joint Deformity] : no joint deformity [Decreased ROM] : no decreased range of motion [Morning Stiffness] : no morning stiffness [Falls] : no falls [Difficulty Standing] : no difficulty standing [Difficulty Walking] : no difficulty walking [Dyspnea] : no dyspnea [Myalgias] : no myalgias [Muscle Weakness] : no muscle weakness [Muscle Cramping] : no muscle cramping [Muscle Spasms] : no muscle spasms [Visual Changes] : no visual changes [Eye Pain] : no eye pain [Dry Eyes] : no dry eyes [Eye Redness] : no eye redness [FreeTextEntry1] : Patient here for a second opinion. \par \par C/o vasovagal episodes in May and in 2018 noticed some lactation episodes. Since then has not passed out but does get orthostatic dizziness. The SOB episodes started appr. 1 year ago - initially with exertion and now sometimes at rest. Has measured her pulse oximeter which is usually normal through these episodes. C/o intermittent chest pains sporadically with these episodes - usually L sided radiating into the substernal and back martir lasting appr. 30 mins to 4 hours. No pleuritic symptoms or relationship to food. ECHO was relatively normal. Wearing a Holter monitor. Has yet to see a pulmonologist or done PFTs. \par \par C/o problems with swallowing pills as well as feeling of solids getting stuck in the chest when she doesn't chew well enough. Has had diarrhea for many years - tested for lactose intolerance and celiac which were negative. Occasionally noticed blood in her stools as well. Had a colonoscopy recently which was normal. Notices that modifying diet does help a little with her symptoms. \par \par Denies any Raynaud's symptoms, oral or nasal ulcers, joint pain or swelling, paresthesias, sinus inflammation, headaches, visual symptoms. No rashes, digital ulcers or telangiectasia. \par \par Has yet to follow up with Endocrine to discuss management of pituitary adenoma. \par \par Ob/Gyn H: A0. Menstrual cycles are irregular in the past 1 year \par

## 2019-03-20 NOTE — PHYSICAL EXAM
[General Appearance - Alert] : alert [General Appearance - In No Acute Distress] : in no acute distress [Sclera] : the sclera and conjunctiva were normal [Oropharynx] : the oropharynx was normal [Outer Ear] : the ears and nose were normal in appearance [Neck Cervical Mass (___cm)] : no neck mass was observed [Neck Appearance] : the appearance of the neck was normal [Jugular Venous Distention Increased] : there was no jugular-venous distention [Thyroid Diffuse Enlargement] : the thyroid was not enlarged [Thyroid Nodule] : there were no palpable thyroid nodules [Auscultation Breath Sounds / Voice Sounds] : lungs were clear to auscultation bilaterally [Heart Rate And Rhythm] : heart rate was normal and rhythm regular [Heart Sounds] : normal S1 and S2 [Heart Sounds Gallop] : no gallops [Murmurs] : no murmurs [Heart Sounds Pericardial Friction Rub] : no pericardial rub [Bowel Sounds] : normal bowel sounds [Edema] : there was no peripheral edema [Abdomen Tenderness] : non-tender [Abdomen Soft] : soft [Abdomen Mass (___ Cm)] : no abdominal mass palpated [Cervical Lymph Nodes Enlarged Anterior Bilaterally] : anterior cervical [Cervical Lymph Nodes Enlarged Posterior Bilaterally] : posterior cervical [Supraclavicular Lymph Nodes Enlarged Bilaterally] : supraclavicular [No Spinal Tenderness] : no spinal tenderness [Skin Color & Pigmentation] : normal skin color and pigmentation [Skin Turgor] : normal skin turgor [No Focal Deficits] : no focal deficits [] : no rash [Impaired Insight] : insight and judgment were intact [Oriented To Time, Place, And Person] : oriented to person, place, and time [Affect] : the affect was normal [FreeTextEntry1] : No synovitis, full ROM in all joints\par

## 2019-03-25 ENCOUNTER — FORM ENCOUNTER (OUTPATIENT)
Age: 29
End: 2019-03-25

## 2019-03-26 ENCOUNTER — APPOINTMENT (OUTPATIENT)
Dept: PSYCHIATRY | Facility: CLINIC | Age: 29
End: 2019-03-26

## 2019-03-26 ENCOUNTER — OUTPATIENT (OUTPATIENT)
Dept: OUTPATIENT SERVICES | Facility: HOSPITAL | Age: 29
LOS: 1 days | End: 2019-03-26
Payer: COMMERCIAL

## 2019-03-26 DIAGNOSIS — R06.00 DYSPNEA, UNSPECIFIED: ICD-10-CM

## 2019-03-26 PROCEDURE — 71046 X-RAY EXAM CHEST 2 VIEWS: CPT | Mod: 26

## 2019-03-28 ENCOUNTER — APPOINTMENT (OUTPATIENT)
Dept: RHEUMATOLOGY | Facility: CLINIC | Age: 29
End: 2019-03-28
Payer: COMMERCIAL

## 2019-03-28 VITALS
HEART RATE: 76 BPM | SYSTOLIC BLOOD PRESSURE: 115 MMHG | TEMPERATURE: 98.9 F | BODY MASS INDEX: 23.5 KG/M2 | WEIGHT: 131 LBS | RESPIRATION RATE: 17 BRPM | OXYGEN SATURATION: 98 % | HEIGHT: 62.5 IN | DIASTOLIC BLOOD PRESSURE: 68 MMHG

## 2019-03-28 PROCEDURE — 99215 OFFICE O/P EST HI 40 MIN: CPT

## 2019-03-28 NOTE — ASSESSMENT
[FreeTextEntry1] : 28 year old female w/ positive MICK 1:2560 centromere pattern and positive centromere AB (>8) and transiently (+)proteinase-3 AB.\par \par 1. Positive centromere antibody: c/o dysphagias and exertional dyspnea which is currently being addressed. Referred for GI w/u including EGD as well as PFTs to evaluate her SOB. Otherwise denies any skin changes, Raynaud's. arthralgias etc. No clinical signs related to scleroderma at this time  i.e. telangiectasia, digital ulcers, NFC changes, synovitis, sclerodactyly. ECHO reviewed - was relatively normal with preserved function. Pending Holter results. Reassured patient that she does not have signs or symptoms suggestive of systemic sclerosis at this time.\par 2. Positive PR3 antibodies: subsequently negative. Initially was positive in low titers - likely artifact/false positive. No signs or symptoms of vasculitis at this time. \par 3. Dysphagia: a/w chronic diarrhea. F/u w/ GI to complete work up - may need EGD and/ or capsule endoscopy to evaluate her bloody diarrhea since colonoscopy was recently normal per patient. \par 4. Dizziness: pt previously referred for ENT evaluation. ? POTS versus orthostatic hypotension given h/o orthostatic symptoms a/w tachycardia in the 140s. F/u cardiology re: holter results. May also need neuro eval if ENT evaluation is normal. \par 5. Exertional dyspnea: improving.  CXR unremarkable.  Recommended pulm f/u, including PFTs. \par \par f/u in 1 year, or sooner prn (discussed symptoms to watch for)

## 2019-03-28 NOTE — REVIEW OF SYSTEMS
[Shortness Of Breath] : shortness of breath [SOB on Exertion] : shortness of breath during exertion [Diarrhea] : diarrhea [As Noted in HPI] : as noted in HPI [Negative] : Heme/Lymph [Wheezing] : no wheezing [Cough] : no cough [Abdominal Pain] : no abdominal pain [Vomiting] : no vomiting [Constipation] : no constipation [Heartburn] : no heartburn [Melena] : no melena

## 2019-03-28 NOTE — HISTORY OF PRESENT ILLNESS
[___ Week(s) Ago] : [unfilled] week(s) ago [Currently Experiencing] : currently [Anorexia] : no anorexia [Weight Loss] : no weight loss [Malaise] : no malaise [Fever] : no fever [Chills] : no chills [Fatigue] : no fatigue [Depression] : no depression [Malar Facial Rash] : no malar facial rash [Skin Lesions] : no lesions [Skin Nodules] : no skin nodules [Oral Ulcers] : no oral ulcers [Cough] : no cough [Dry Mouth] : no dry mouth [Dysphonia] : no dysphonia [Dysphagia] : no dysphagia [Shortness of Breath] : no shortness of breath [Chest Pain] : no chest pain [Arthralgias] : no arthralgias [Joint Swelling] : no joint swelling [Joint Warmth] : no joint warmth [Joint Deformity] : no joint deformity [Decreased ROM] : no decreased range of motion [Morning Stiffness] : no morning stiffness [Falls] : no falls [Difficulty Standing] : no difficulty standing [Difficulty Walking] : no difficulty walking [Dyspnea] : no dyspnea [Myalgias] : no myalgias [Muscle Weakness] : no muscle weakness [Muscle Spasms] : no muscle spasms [Muscle Cramping] : no muscle cramping [Visual Changes] : no visual changes [Eye Pain] : no eye pain [Eye Redness] : no eye redness [Dry Eyes] : no dry eyes [FreeTextEntry1] : Feeling "the same" overall since last visit.  Stil w/ dysphagia and dizziness/lightheadedness, unchanged.  Dyspnea improving.  No new complaints.\par Denies any Raynaud's symptoms, oral or nasal ulcers, joint pain or swelling, paresthesias, sinus inflammation, headaches, visual symptoms. No rashes, digital ulcers or telangiectasia. \par

## 2019-03-28 NOTE — PHYSICAL EXAM
[General Appearance - Alert] : alert [General Appearance - In No Acute Distress] : in no acute distress [Sclera] : the sclera and conjunctiva were normal [Outer Ear] : the ears and nose were normal in appearance [Oropharynx] : the oropharynx was normal [Neck Appearance] : the appearance of the neck was normal [Neck Cervical Mass (___cm)] : no neck mass was observed [Jugular Venous Distention Increased] : there was no jugular-venous distention [Thyroid Diffuse Enlargement] : the thyroid was not enlarged [Thyroid Nodule] : there were no palpable thyroid nodules [Auscultation Breath Sounds / Voice Sounds] : lungs were clear to auscultation bilaterally [Heart Rate And Rhythm] : heart rate was normal and rhythm regular [Heart Sounds] : normal S1 and S2 [Heart Sounds Gallop] : no gallops [Murmurs] : no murmurs [Heart Sounds Pericardial Friction Rub] : no pericardial rub [Edema] : there was no peripheral edema [Bowel Sounds] : normal bowel sounds [Abdomen Soft] : soft [Abdomen Tenderness] : non-tender [Abdomen Mass (___ Cm)] : no abdominal mass palpated [Cervical Lymph Nodes Enlarged Posterior Bilaterally] : posterior cervical [Cervical Lymph Nodes Enlarged Anterior Bilaterally] : anterior cervical [Supraclavicular Lymph Nodes Enlarged Bilaterally] : supraclavicular [No Spinal Tenderness] : no spinal tenderness [Skin Color & Pigmentation] : normal skin color and pigmentation [Skin Turgor] : normal skin turgor [] : no rash [No Focal Deficits] : no focal deficits [Oriented To Time, Place, And Person] : oriented to person, place, and time [Impaired Insight] : insight and judgment were intact [Affect] : the affect was normal [FreeTextEntry1] : No synovitis, full ROM in all joints\par

## 2019-04-18 ENCOUNTER — APPOINTMENT (OUTPATIENT)
Dept: CARDIOLOGY | Facility: CLINIC | Age: 29
End: 2019-04-18
Payer: COMMERCIAL

## 2019-04-18 VITALS
BODY MASS INDEX: 23.68 KG/M2 | OXYGEN SATURATION: 100 % | DIASTOLIC BLOOD PRESSURE: 77 MMHG | WEIGHT: 132 LBS | SYSTOLIC BLOOD PRESSURE: 111 MMHG | HEART RATE: 85 BPM | HEIGHT: 62.5 IN | RESPIRATION RATE: 15 BRPM

## 2019-04-18 DIAGNOSIS — R55 SYNCOPE AND COLLAPSE: ICD-10-CM

## 2019-04-18 DIAGNOSIS — R06.00 DYSPNEA, UNSPECIFIED: ICD-10-CM

## 2019-04-18 PROCEDURE — 99213 OFFICE O/P EST LOW 20 MIN: CPT

## 2019-04-18 NOTE — PHYSICAL EXAM
[Normal Conjunctiva] : the conjunctiva exhibited no abnormalities [General Appearance - In No Acute Distress] : no acute distress [Normal Oral Mucosa] : normal oral mucosa [Abdomen Soft] : soft [Auscultation Breath Sounds / Voice Sounds] : lungs were clear to auscultation bilaterally [Abnormal Walk] : normal gait [Abdomen Tenderness] : non-tender [Nail Clubbing] : no clubbing of the fingernails [Cyanosis, Localized] : no localized cyanosis [Skin Color & Pigmentation] : normal skin color and pigmentation [Affect] : the affect was normal [Oriented To Time, Place, And Person] : oriented to person, place, and time [FreeTextEntry1] : Cardiac: Normal S1 and split S2, no S3, no S4. No audible murmurs or rubs. Regular rate and rhythm.

## 2019-04-18 NOTE — DISCUSSION/SUMMARY
[FreeTextEntry1] : 1. No additional cardiac testing at this time.\par 2. Consider loop recorder for further and with her monitoring.\par 3. Followup with GI and pulmonary.\par 4. Followup with rheumatology to consider diagnosis of scleroderma or Wegener's.\par 5. Follow up here in three months.

## 2019-04-18 NOTE — ASSESSMENT
[FreeTextEntry1] : Echocardiogram March 13, 2019 demonstrated left vaginal normal doesn't function with ejection fraction of 55-60%. This point insufficiency was noted but no significant structural abnormalities.\par \par Event monitor was scheduled for March 15 and 13th but she was only able to wear it from March 15 to March 22 intermittently. She did have some episodes while wearing the monitor but only sinus rhythm with no ectopy was noted.\par \par 28-year-old female with no clear significant past medical history who was recently diagnosed with a pituitary adenoma and presents to me for evaluation of syncope, presyncope and dizziness. Echocardiogram is completely unremarkable. Some evaluation by a protime monitoring showed only sinus rhythm. Unfortunately this was limited by her allergic reaction to the glue. She continues to have episodes which he feels tachycardic and short of breath. We could consider loop recorder placement for further ambulatory monitoring as this may be the only option. She will think about this option. Overall I doubt there is any primary cardiac issue causing any of her symptoms at this time.

## 2019-04-18 NOTE — HISTORY OF PRESENT ILLNESS
[FreeTextEntry1] : Patient returns to office today reporting primarily episodes where she will suddenly become tachycardic and also have discomfort in her chest associated with some shortness of breath. She still gets dizziness at times but had no additional syncope. She was only able to briefly where the ambulatory monitor because she had issues with allergic reactions to the glue. She has had work up with rheumatology and the possibility for diagnoses of scleroderma or Wegener's granulomatosis been considered. Patient denies palpitations, orthopnea, presyncope, syncope.

## 2019-05-08 ENCOUNTER — APPOINTMENT (OUTPATIENT)
Dept: PSYCHIATRY | Facility: CLINIC | Age: 29
End: 2019-05-08
Payer: COMMERCIAL

## 2019-05-08 PROCEDURE — 90837 PSYTX W PT 60 MINUTES: CPT

## 2019-05-10 ENCOUNTER — APPOINTMENT (OUTPATIENT)
Dept: PULMONOLOGY | Facility: CLINIC | Age: 29
End: 2019-05-10
Payer: COMMERCIAL

## 2019-05-10 PROCEDURE — 94726 PLETHYSMOGRAPHY LUNG VOLUMES: CPT

## 2019-05-10 PROCEDURE — 94729 DIFFUSING CAPACITY: CPT

## 2019-05-10 PROCEDURE — 94010 BREATHING CAPACITY TEST: CPT

## 2019-05-22 ENCOUNTER — APPOINTMENT (OUTPATIENT)
Dept: PSYCHIATRY | Facility: CLINIC | Age: 29
End: 2019-05-22
Payer: COMMERCIAL

## 2019-05-22 DIAGNOSIS — F33.9 MAJOR DEPRESSIVE DISORDER, RECURRENT, UNSPECIFIED: ICD-10-CM

## 2019-05-22 PROCEDURE — 90837 PSYTX W PT 60 MINUTES: CPT

## 2019-07-03 ENCOUNTER — APPOINTMENT (OUTPATIENT)
Dept: CARDIOLOGY | Facility: CLINIC | Age: 29
End: 2019-07-03

## 2019-10-11 ENCOUNTER — RESULT REVIEW (OUTPATIENT)
Age: 29
End: 2019-10-11

## 2019-12-19 ENCOUNTER — TRANSCRIPTION ENCOUNTER (OUTPATIENT)
Age: 29
End: 2019-12-19

## 2020-03-10 ENCOUNTER — APPOINTMENT (OUTPATIENT)
Dept: RHEUMATOLOGY | Facility: CLINIC | Age: 30
End: 2020-03-10
Payer: COMMERCIAL

## 2020-03-10 VITALS
HEIGHT: 62.5 IN | BODY MASS INDEX: 25.84 KG/M2 | HEART RATE: 93 BPM | DIASTOLIC BLOOD PRESSURE: 76 MMHG | TEMPERATURE: 98.6 F | OXYGEN SATURATION: 99 % | WEIGHT: 144 LBS | SYSTOLIC BLOOD PRESSURE: 116 MMHG

## 2020-03-10 DIAGNOSIS — K21.9 GASTRO-ESOPHAGEAL REFLUX DISEASE W/OUT ESOPHAGITIS: ICD-10-CM

## 2020-03-10 PROCEDURE — 99214 OFFICE O/P EST MOD 30 MIN: CPT

## 2020-03-10 NOTE — HISTORY OF PRESENT ILLNESS
[___ Week(s) Ago] : [unfilled] week(s) ago [Currently Experiencing] : currently [Anorexia] : no anorexia [Weight Loss] : no weight loss [Malaise] : no malaise [Fever] : no fever [Chills] : no chills [Fatigue] : no fatigue [Depression] : no depression [Malar Facial Rash] : no malar facial rash [Skin Lesions] : no lesions [Skin Nodules] : no skin nodules [Oral Ulcers] : no oral ulcers [Cough] : no cough [Dry Mouth] : no dry mouth [Dysphonia] : no dysphonia [Dysphagia] : no dysphagia [Shortness of Breath] : no shortness of breath [Chest Pain] : no chest pain [Arthralgias] : no arthralgias [Joint Swelling] : no joint swelling [Joint Warmth] : no joint warmth [Joint Deformity] : no joint deformity [Decreased ROM] : no decreased range of motion [Morning Stiffness] : no morning stiffness [Falls] : no falls [Difficulty Standing] : no difficulty standing [Difficulty Walking] : no difficulty walking [Dyspnea] : no dyspnea [Myalgias] : no myalgias [Muscle Weakness] : no muscle weakness [Muscle Spasms] : no muscle spasms [Muscle Cramping] : no muscle cramping [Visual Changes] : no visual changes [Eye Pain] : no eye pain [Eye Redness] : no eye redness [Dry Eyes] : no dry eyes [de-identified] : Last appointment 1 year ago [FreeTextEntry1] : Had Raynaud's symptoms in the L foot - toes turned white in the cold. Did not have the symptoms in her hands. Only noticed it this winter. Also worsening GERD.

## 2020-03-10 NOTE — DATA REVIEWED
[FreeTextEntry1] : Labs reviewed with patient. PFTs reviewed\par \par TTE (03/2019): reviewed in [facility] \par MRI Brain (10/2018): reviewed in [facility]\par \par

## 2020-03-10 NOTE — REVIEW OF SYSTEMS
[SOB on Exertion] : shortness of breath during exertion [As Noted in HPI] : as noted in HPI [Negative] : Heme/Lymph [Shortness Of Breath] : no shortness of breath [Wheezing] : no wheezing [Cough] : no cough [Abdominal Pain] : no abdominal pain [Vomiting] : no vomiting [Constipation] : no constipation [Diarrhea] : no diarrhea [Heartburn] : heartburn [Melena] : no melena

## 2020-03-10 NOTE — ASSESSMENT
[FreeTextEntry1] : 29 year old female here for follow up. H/o positive MICK 1:2560 centromere pattern and positive centromere AB (>8) and transiently (+)proteinase-3 AB a/w GERD and Raynaud's\par \par 1. Positive centromere antibody: a/w GERD And new onset Raynaud's. Otherwise denies any skin changes,  arthralgias. No clinical signs related to scleroderma at this time  i.e. telangiectasia, digital ulcers, NFC changes, synovitis, sclerodactyly. ECHO reviewed - was relatively normal with preserved function in March 2019. PFTs were normal in Sept 2019. \par - Raynaud's - continue conservative measure and monitor symptoms. Consider CCB if symptoms increase in frequency or severity despite conservative measures. \par - GERD: continue Prevacid daily. Referral given for GI evaluation \par - Repeat ECHO. PFTs to be performed in Sept 2020. \par \par 2. Positive PR3 antibodies: subsequently negative. Initially was positive in low titers - likely artifact/false positive. No signs or symptoms of vasculitis at this time. Repeat labs today. \par \par 3. Dizziness: F/u Neurology. ? POTS versus orthostatic hypotension given h/o orthostatic symptoms a/w tachycardia in the 140s. Holter was negative for any skipped beats or arrhythmias.  \par \par 5. Exertional dyspnea: normal PFTs. Stable symptoms. Monitor for now. \par \par F/u in 3 months \par

## 2020-04-26 ENCOUNTER — MESSAGE (OUTPATIENT)
Age: 30
End: 2020-04-26

## 2020-05-02 LAB
SARS-COV-2 IGG SERPL IA-ACNC: 0.1 INDEX
SARS-COV-2 IGG SERPL QL IA: NEGATIVE

## 2020-05-21 ENCOUNTER — LABORATORY RESULT (OUTPATIENT)
Age: 30
End: 2020-05-21

## 2020-05-21 ENCOUNTER — APPOINTMENT (OUTPATIENT)
Dept: DISASTER EMERGENCY | Facility: CLINIC | Age: 30
End: 2020-05-21

## 2020-07-11 ENCOUNTER — APPOINTMENT (OUTPATIENT)
Dept: DERMATOLOGY | Facility: CLINIC | Age: 30
End: 2020-07-11
Payer: COMMERCIAL

## 2020-07-11 PROCEDURE — 99204 OFFICE O/P NEW MOD 45 MIN: CPT

## 2020-08-10 ENCOUNTER — APPOINTMENT (OUTPATIENT)
Dept: GASTROENTEROLOGY | Facility: CLINIC | Age: 30
End: 2020-08-10
Payer: COMMERCIAL

## 2020-08-10 VITALS
HEART RATE: 79 BPM | DIASTOLIC BLOOD PRESSURE: 70 MMHG | BODY MASS INDEX: 25.66 KG/M2 | HEIGHT: 62.5 IN | SYSTOLIC BLOOD PRESSURE: 110 MMHG | WEIGHT: 143 LBS | OXYGEN SATURATION: 98 % | TEMPERATURE: 98 F

## 2020-08-10 DIAGNOSIS — R14.0 ABDOMINAL DISTENSION (GASEOUS): ICD-10-CM

## 2020-08-10 DIAGNOSIS — R13.10 DYSPHAGIA, UNSPECIFIED: ICD-10-CM

## 2020-08-10 PROCEDURE — 99215 OFFICE O/P EST HI 40 MIN: CPT

## 2020-08-14 ENCOUNTER — APPOINTMENT (OUTPATIENT)
Dept: PEDIATRIC ALLERGY IMMUNOLOGY | Facility: CLINIC | Age: 30
End: 2020-08-14
Payer: COMMERCIAL

## 2020-08-14 VITALS
DIASTOLIC BLOOD PRESSURE: 77 MMHG | HEART RATE: 84 BPM | HEIGHT: 62.48 IN | BODY MASS INDEX: 25.48 KG/M2 | TEMPERATURE: 97.9 F | WEIGHT: 142 LBS | SYSTOLIC BLOOD PRESSURE: 116 MMHG | OXYGEN SATURATION: 98 %

## 2020-08-14 DIAGNOSIS — R89.9 UNSPECIFIED ABNORMAL FINDING IN SPECIMENS FROM OTHER ORGANS, SYSTEMS AND TISSUES: ICD-10-CM

## 2020-08-14 DIAGNOSIS — L50.9 URTICARIA, UNSPECIFIED: ICD-10-CM

## 2020-08-14 PROCEDURE — 99203 OFFICE O/P NEW LOW 30 MIN: CPT | Mod: GC

## 2020-08-14 RX ORDER — DOXYCYCLINE HYCLATE 100 MG/1
100 CAPSULE ORAL
Qty: 28 | Refills: 0 | Status: DISCONTINUED | COMMUNITY
Start: 2020-06-30 | End: 2020-08-14

## 2020-08-14 RX ORDER — HALOBETASOL PROPIONATE 0.5 MG/G
0.05 OINTMENT TOPICAL
Qty: 50 | Refills: 0 | Status: DISCONTINUED | COMMUNITY
Start: 2020-06-30 | End: 2020-08-14

## 2020-08-18 PROBLEM — R89.9 ABNORMAL LABORATORY TEST: Status: ACTIVE | Noted: 2020-08-18

## 2020-08-18 PROBLEM — L50.9 URTICARIA: Status: ACTIVE | Noted: 2020-08-18

## 2020-08-18 NOTE — HISTORY OF PRESENT ILLNESS
[Eczematous rashes] : eczematous rashes [Asthma] : asthma [de-identified] : JILL LIZ  is a 30 year year  old female with history of pituitary adenoma, +centromere ab (but no clinical manifestations of scleroderma) who presents for evaluation of rash.\par  \par She reports having a rash every day for 6 weeks that would come and go on the skin around the wrists, bilaterally, elbows bilaterally, and thighs bilaterally.  She saw her dermatologist who stated it appeared allergic in appearance and as per rheumatologist did not seem to be consistent with scleroderma cutaneous manifestations (which usually have a petechial appearance). \par \par The rash occurred every day, always occurring in bilateral hands/wrists, and bilateral elbows, bilateral knees and thighs. She states the rash on the back of her hands and rash on her elbows seemed to come at the same time. The rash on the knees would come and go seemingly unrelated to the rash in other parts of her body. She states the rash was very itchy, the joints are not affected- no joint pain, edema, or effusion. Picture shown of the rash on the back of her hand demonstrates multiple raised erythematous lesions, with whitened apex, wheal -like in appearance. She reports the rash has since completely self-resolved two weeks ago and has had only one instance of a small patch of itchy skin on her left hand that resolved within an hour. \par Halobetasol, Benadryl did not help alleviate rash occurrence. \par \par She had an ImmunoCAP panel to environmental and food allergens performed in 7/2020 showing positive IgE levels to dust mites, cockroaches, shrimp. She states that during the time she developed and continued to have the rash she was eating a considerable amount of shrimp (approximately 2-3 times per week). Over this period of time she was also eating scallops, clams, octopus, salmon. She reports having eaten shellfish after the rash had gone away (within the past two weeks) without a problem. She continues to eat fish. She denies ever having any facial swelling, lip swelling, respiratory symptoms, mouth or throat itching. \par \par She states that she had food allergies as a child but grew out of them and does not restrict her diet due to allergy concerns. She eats meat rarely and instead consumes a lot of fish and shellfish.  \par \par Drug allergy: denies.\par Venom allergy: denies. \par Seasonal allergies: Worst in fall, well controlled and mild symptoms. Does not take medication. \par \par The patient denies any history of anaphylaxis, angioedema, asthma/respiratory manifestations.\par   [Food Allergies] : food allergies

## 2020-08-18 NOTE — PHYSICAL EXAM
[Well Nourished] : well nourished [Alert] : alert [Healthy Appearance] : healthy appearance [No Acute Distress] : no acute distress [Well Developed] : well developed [Normal Pupil & Iris Size/Symmetry] : normal pupil and iris size and symmetry [No Discharge] : no discharge [No Photophobia] : no photophobia [Sclera Not Icteric] : sclera not icteric [Normal Outer Ear/Nose] : the ears and nose were normal in appearance [Normal Lips/Tongue] : the lips and tongue were normal [Normal Tonsils] : normal tonsils [No Thrush] : no thrush [Supple] : the neck was supple [Normal Rate and Effort] : normal respiratory rhythm and effort [No Crackles] : no crackles [No Retractions] : no retractions [Bilateral Audible Breath Sounds] : bilateral audible breath sounds [Normal Rate] : heart rate was normal  [Normal S1, S2] : normal S1 and S2 [No murmur] : no murmur [Soft] : abdomen soft [Regular Rhythm] : with a regular rhythm [Not Distended] : not distended [Skin Intact] : skin intact  [No Rash] : no rash [No Skin Lesions] : no skin lesions [No Edema] : no edema [No clubbing] : no clubbing [Normal Mood] : mood was normal [No Cyanosis] : no cyanosis [Normal Affect] : affect was normal [Alert, Awake, Oriented as Age-Appropriate] : alert, awake, oriented as age appropriate [Boggy Nasal Turbinates] : boggy and/or pale nasal turbinates [de-identified] : very small, multiple, faintly erythematous, punctate lesions on dorsum of bilateral hands

## 2020-08-18 NOTE — REASON FOR VISIT
[Initial Consultation] : an initial consultation for [Allergy Evaluation/ Skin Testing] : allergy evaluation and or skin testing [Rash] : rash

## 2020-08-18 NOTE — CONSULT LETTER
[Dear  ___] : Dear  [unfilled], [Consult Letter:] : I had the pleasure of evaluating your patient, [unfilled]. [This report is provisional, pending the completion of the evaluation.  A final diagnosis and plan will follow.] : This report is provisional, pending the completion of the evaluation.  A final diagnosis and plan will follow. [Referral Closing:] : Thank you very much for seeing this patient.  If you have any questions, please do not hesitate to contact me. [Sincerely,] : Sincerely, [FreeTextEntry3] : Jesus May MD\par Fellow, Division of Allergy/Immunology\par [employer] [facility] \par \par Supriya Mejia MD, INES LEIGH\par Associate , \par Assistant Fellowship Training ,\par Director, [facility] and [employer] [facility]\par Division of Allergy and Immunology\par [facility]\par [facility]\par , Pediatrics and Medicine\par [employer] [facility]\par [address], [address]\[place] [address]\par [phone]\par

## 2020-08-18 NOTE — SOCIAL HISTORY
[Parent(s)] : parent(s) [House] : [unfilled] lives in a house  [Dog] : dog [FreeTextEntry2] : nurse [Bedroom] : not in the bedroom [Basement] : not in the basement [Dust Mite Covers] : does not have dust mite covers [Smokers in Household] : there are no smokers in the home [Living Area] : not in the living area [de-identified] : Bird

## 2020-09-02 ENCOUNTER — APPOINTMENT (OUTPATIENT)
Dept: GASTROENTEROLOGY | Facility: HOSPITAL | Age: 30
End: 2020-09-02

## 2020-09-04 ENCOUNTER — APPOINTMENT (OUTPATIENT)
Dept: PEDIATRIC ALLERGY IMMUNOLOGY | Facility: CLINIC | Age: 30
End: 2020-09-04
Payer: COMMERCIAL

## 2020-09-04 VITALS
OXYGEN SATURATION: 99 % | BODY MASS INDEX: 25.3 KG/M2 | HEART RATE: 82 BPM | SYSTOLIC BLOOD PRESSURE: 106 MMHG | HEIGHT: 62.48 IN | DIASTOLIC BLOOD PRESSURE: 73 MMHG | WEIGHT: 140.99 LBS

## 2020-09-04 DIAGNOSIS — J30.9 ALLERGIC RHINITIS, UNSPECIFIED: ICD-10-CM

## 2020-09-04 DIAGNOSIS — R21 RASH AND OTHER NONSPECIFIC SKIN ERUPTION: ICD-10-CM

## 2020-09-04 DIAGNOSIS — T78.1XXA OTHER ADVERSE FOOD REACTIONS, NOT ELSEWHERE CLASSIFIED, INITIAL ENCOUNTER: ICD-10-CM

## 2020-09-04 DIAGNOSIS — Z01.82 ENCOUNTER FOR ALLERGY TESTING: ICD-10-CM

## 2020-09-04 PROCEDURE — 99214 OFFICE O/P EST MOD 30 MIN: CPT | Mod: 25

## 2020-09-04 PROCEDURE — 95004 PERQ TESTS W/ALRGNC XTRCS: CPT

## 2020-09-04 NOTE — IMPRESSION
[Allergy Testing Dust Mite] : dust mites [Allergy Testing Cat] : cat [Allergy Testing Mixed Feathers] : feathers [Allergy Testing Cockroach] : cockroach [Allergy Testing Dog] : dog [Allergy Testing Trees] : trees [] : molds [Allergy Testing Weeds] : weeds [________] : [unfilled] [Allergy Testing Grasses] : grasses

## 2020-09-11 PROBLEM — Z01.82 ENCOUNTER FOR ALLERGY TESTING: Status: ACTIVE | Noted: 2020-09-11

## 2020-09-11 PROBLEM — R21 RASH: Status: ACTIVE | Noted: 2020-09-11

## 2020-09-11 PROBLEM — J30.9 ALLERGIC RHINITIS: Status: ACTIVE | Noted: 2020-09-11

## 2020-09-11 NOTE — PHYSICAL EXAM
[Alert] : alert [Well Nourished] : well nourished [Healthy Appearance] : healthy appearance [No Acute Distress] : no acute distress [Well Developed] : well developed [Normal Pupil & Iris Size/Symmetry] : normal pupil and iris size and symmetry [No Discharge] : no discharge [No Photophobia] : no photophobia [Sclera Not Icteric] : sclera not icteric [Normal TMs] : both tympanic membranes were normal [Normal Nasal Mucosa] : the nasal mucosa was normal [Normal Lips/Tongue] : the lips and tongue were normal [Normal Outer Ear/Nose] : the ears and nose were normal in appearance [Normal Tonsils] : normal tonsils [No Thrush] : no thrush [Normal Dentition] : normal dentition [No Oral Lesions or Ulcers] : no oral lesions or ulcers [Pale mucosa] : pale mucosa [Supple] : the neck was supple [Normal Rate and Effort] : normal respiratory rhythm and effort [Normal Palpation] : palpation of the chest revealed no abnormalities [No Crackles] : no crackles [No Retractions] : no retractions [Bilateral Audible Breath Sounds] : bilateral audible breath sounds [Normal Rate] : heart rate was normal  [Normal S1, S2] : normal S1 and S2 [No murmur] : no murmur [Regular Rhythm] : with a regular rhythm [Soft] : abdomen soft [Not Tender] : non-tender [Not Distended] : not distended [No HSM] : no hepato-splenomegaly [Normal Cervical Lymph Nodes] : cervical [Normal Axillary Lumph Nodes] : axillary [Skin Intact] : skin intact  [No Rash] : no rash [No Skin Lesions] : no skin lesions [No Joint Swelling or Erythema] : no joint swelling or erythema [No clubbing] : no clubbing [No Edema] : no edema [No Cyanosis] : no cyanosis [Normal Mood] : mood was normal [Normal Affect] : affect was normal [Alert, Awake, Oriented as Age-Appropriate] : alert, awake, oriented as age appropriate [de-identified] : very small, multiple, faintly erythematous, punctate lesions on dorsum of bilateral hands. \par very small, multiple, faintly erythematous, punctate lesions on dorsum of bilateral hands. \par very small, multiple, faintly erythematous, punctate lesions on dorsum of bilateral hands. \par very small, multiple, faintly erythematous, punctate lesions on dorsum of bilateral hands. \par very small multiple faintly erythematous punctate lesions on dorsum of b/l hands

## 2020-09-11 NOTE — END OF VISIT
[FreeTextEntry3] : I personally discussed this patient with the Physician Assistant at the time of the visit. I agree with te assessment and plan as written, unless noted below.\par I was present with the Physician Assistant during the key portions of the history and exam. I discussed the case with the Physician Assistant and agree with the findings and plan as documented in the Physician Assistant's note, unless noted below.\par

## 2020-09-11 NOTE — HISTORY OF PRESENT ILLNESS
[de-identified] : 30 year year old female with history of pituitary adenoma, +centromere ab (but no clinical manifestations of scleroderma) here for follow up.\par \par ALLERGIC RHINITIS/FOOD ALLERGY \par She had an ImmunoCAP panel to environmental and food allergens performed in 7/2020 showing positive IgE levels to dust mites, cockroaches, shrimp. She states that during the time she developed and continued to have the rash she was eating a considerable amount of shrimp (approximately 2-3 times per week). Over this period of time she was also eating scallops, clams, octopus, salmon. She reports having eaten shellfish after the rash had gone away (within the past two weeks) without a problem. She continues to eat fish. She denies ever having any facial swelling, lip swelling, respiratory symptoms, mouth or throat itching. \par \par She states that she had food allergies as a child but grew out of them and does not restrict her diet due to allergy concerns. She eats meat rarely and instead consumes a lot of fish and shellfish. \par \par History of rash:\par She reports having a rash every day for 6 weeks that would come and go on the skin around the wrists, bilaterally, elbows bilaterally, and thighs bilaterally. She saw her dermatologist who stated it appeared allergic in appearance and as per rheumatologist did not seem to be consistent with scleroderma cutaneous manifestations (which usually have a petechial appearance). \par \par The rash occurred every day, always occurring in bilateral hands/wrists, and bilateral elbows, bilateral knees and thighs. She states the rash on the back of her hands and rash on her elbows seemed to come at the same time. The rash on the knees would come and go seemingly unrelated to the rash in other parts of her body. She states the rash was very itchy, the joints are not affected- no joint pain, edema, or effusion. Picture shown of the rash on the back of her hand demonstrates multiple raised erythematous lesions, with whitened apex, wheal -like in appearance. She reports the rash has since completely self-resolved two weeks ago and has had only one instance of a small patch of itchy skin on her left hand that resolved within an hour. \par Halobetasol, Benadryl did not help alleviate rash occurrence. \par No recent rash. \par \par Drug allergy: denies.\par Venom allergy: denies. \par Seasonal allergies: Worst in fall, well controlled and mild symptoms. Does not take medication. \par \par The patient denies any history of anaphylaxis, angioedema, asthma/respiratory manifestations.\par  \par No history or symptoms of asthma, eczematous rashes, food allergies. \par  \par

## 2020-09-15 ENCOUNTER — APPOINTMENT (OUTPATIENT)
Dept: RHEUMATOLOGY | Facility: CLINIC | Age: 30
End: 2020-09-15
Payer: COMMERCIAL

## 2020-09-15 ENCOUNTER — EMERGENCY (EMERGENCY)
Facility: HOSPITAL | Age: 30
LOS: 1 days | Discharge: HOME | End: 2020-09-15
Attending: EMERGENCY MEDICINE
Payer: COMMERCIAL

## 2020-09-15 VITALS — HEIGHT: 62 IN | WEIGHT: 141.1 LBS

## 2020-09-15 VITALS
DIASTOLIC BLOOD PRESSURE: 77 MMHG | SYSTOLIC BLOOD PRESSURE: 102 MMHG | RESPIRATION RATE: 16 BRPM | HEART RATE: 75 BPM | OXYGEN SATURATION: 100 %

## 2020-09-15 VITALS
BODY MASS INDEX: 25.76 KG/M2 | WEIGHT: 140 LBS | SYSTOLIC BLOOD PRESSURE: 106 MMHG | OXYGEN SATURATION: 99 % | RESPIRATION RATE: 16 BRPM | DIASTOLIC BLOOD PRESSURE: 71 MMHG | TEMPERATURE: 98 F | HEART RATE: 79 BPM | HEIGHT: 62 IN

## 2020-09-15 LAB
ALBUMIN SERPL ELPH-MCNC: 4.8 G/DL — SIGNIFICANT CHANGE UP (ref 3.3–5)
ALP SERPL-CCNC: 43 U/L — SIGNIFICANT CHANGE UP (ref 40–120)
ALT FLD-CCNC: 14 U/L — SIGNIFICANT CHANGE UP (ref 10–45)
ANION GAP SERPL CALC-SCNC: 12 MMOL/L — SIGNIFICANT CHANGE UP (ref 5–17)
AST SERPL-CCNC: 18 U/L — SIGNIFICANT CHANGE UP (ref 10–40)
BASOPHILS # BLD AUTO: 0.04 K/UL — SIGNIFICANT CHANGE UP (ref 0–0.2)
BASOPHILS NFR BLD AUTO: 0.4 % — SIGNIFICANT CHANGE UP (ref 0–2)
BILIRUB SERPL-MCNC: 0.4 MG/DL — SIGNIFICANT CHANGE UP (ref 0.2–1.2)
BUN SERPL-MCNC: 11 MG/DL — SIGNIFICANT CHANGE UP (ref 7–23)
CALCIUM SERPL-MCNC: 9.7 MG/DL — SIGNIFICANT CHANGE UP (ref 8.4–10.5)
CHLORIDE SERPL-SCNC: 100 MMOL/L — SIGNIFICANT CHANGE UP (ref 96–108)
CO2 SERPL-SCNC: 26 MMOL/L — SIGNIFICANT CHANGE UP (ref 22–31)
CREAT SERPL-MCNC: 0.69 MG/DL — SIGNIFICANT CHANGE UP (ref 0.5–1.3)
EOSINOPHIL # BLD AUTO: 0.17 K/UL — SIGNIFICANT CHANGE UP (ref 0–0.5)
EOSINOPHIL NFR BLD AUTO: 1.8 % — SIGNIFICANT CHANGE UP (ref 0–6)
GLUCOSE SERPL-MCNC: 84 MG/DL — SIGNIFICANT CHANGE UP (ref 70–99)
HCT VFR BLD CALC: 45 % — SIGNIFICANT CHANGE UP (ref 34.5–45)
HGB BLD-MCNC: 14.7 G/DL — SIGNIFICANT CHANGE UP (ref 11.5–15.5)
IMM GRANULOCYTES NFR BLD AUTO: 0.2 % — SIGNIFICANT CHANGE UP (ref 0–1.5)
LYMPHOCYTES # BLD AUTO: 2.56 K/UL — SIGNIFICANT CHANGE UP (ref 1–3.3)
LYMPHOCYTES # BLD AUTO: 26.6 % — SIGNIFICANT CHANGE UP (ref 13–44)
MAGNESIUM SERPL-MCNC: 2 MG/DL — SIGNIFICANT CHANGE UP (ref 1.6–2.6)
MCHC RBC-ENTMCNC: 28.9 PG — SIGNIFICANT CHANGE UP (ref 27–34)
MCHC RBC-ENTMCNC: 32.7 GM/DL — SIGNIFICANT CHANGE UP (ref 32–36)
MCV RBC AUTO: 88.6 FL — SIGNIFICANT CHANGE UP (ref 80–100)
MONOCYTES # BLD AUTO: 0.67 K/UL — SIGNIFICANT CHANGE UP (ref 0–0.9)
MONOCYTES NFR BLD AUTO: 7 % — SIGNIFICANT CHANGE UP (ref 2–14)
NEUTROPHILS # BLD AUTO: 6.17 K/UL — SIGNIFICANT CHANGE UP (ref 1.8–7.4)
NEUTROPHILS NFR BLD AUTO: 64 % — SIGNIFICANT CHANGE UP (ref 43–77)
NRBC # BLD: 0 /100 WBCS — SIGNIFICANT CHANGE UP (ref 0–0)
PLATELET # BLD AUTO: 261 K/UL — SIGNIFICANT CHANGE UP (ref 150–400)
POTASSIUM SERPL-MCNC: 3.7 MMOL/L — SIGNIFICANT CHANGE UP (ref 3.5–5.3)
POTASSIUM SERPL-SCNC: 3.7 MMOL/L — SIGNIFICANT CHANGE UP (ref 3.5–5.3)
PROT SERPL-MCNC: 7.5 G/DL — SIGNIFICANT CHANGE UP (ref 6–8.3)
RBC # BLD: 5.08 M/UL — SIGNIFICANT CHANGE UP (ref 3.8–5.2)
RBC # FLD: 12 % — SIGNIFICANT CHANGE UP (ref 10.3–14.5)
SODIUM SERPL-SCNC: 138 MMOL/L — SIGNIFICANT CHANGE UP (ref 135–145)
T3 SERPL-MCNC: 94 NG/DL — SIGNIFICANT CHANGE UP (ref 80–200)
T4 AB SER-ACNC: 6.4 UG/DL — SIGNIFICANT CHANGE UP (ref 4.6–12)
TROPONIN T, HIGH SENSITIVITY RESULT: <6 NG/L — SIGNIFICANT CHANGE UP (ref 0–51)
TSH SERPL-MCNC: 2.13 UIU/ML — SIGNIFICANT CHANGE UP (ref 0.27–4.2)
WBC # BLD: 9.63 K/UL — SIGNIFICANT CHANGE UP (ref 3.8–10.5)
WBC # FLD AUTO: 9.63 K/UL — SIGNIFICANT CHANGE UP (ref 3.8–10.5)

## 2020-09-15 PROCEDURE — 71045 X-RAY EXAM CHEST 1 VIEW: CPT

## 2020-09-15 PROCEDURE — 84480 ASSAY TRIIODOTHYRONINE (T3): CPT

## 2020-09-15 PROCEDURE — 93010 ELECTROCARDIOGRAM REPORT: CPT

## 2020-09-15 PROCEDURE — 84443 ASSAY THYROID STIM HORMONE: CPT

## 2020-09-15 PROCEDURE — 84436 ASSAY OF TOTAL THYROXINE: CPT

## 2020-09-15 PROCEDURE — 99284 EMERGENCY DEPT VISIT MOD MDM: CPT | Mod: 25

## 2020-09-15 PROCEDURE — 80053 COMPREHEN METABOLIC PANEL: CPT

## 2020-09-15 PROCEDURE — 84484 ASSAY OF TROPONIN QUANT: CPT

## 2020-09-15 PROCEDURE — 85025 COMPLETE CBC W/AUTO DIFF WBC: CPT

## 2020-09-15 PROCEDURE — 93005 ELECTROCARDIOGRAM TRACING: CPT

## 2020-09-15 PROCEDURE — 71045 X-RAY EXAM CHEST 1 VIEW: CPT | Mod: 26

## 2020-09-15 PROCEDURE — 99285 EMERGENCY DEPT VISIT HI MDM: CPT

## 2020-09-15 PROCEDURE — 99214 OFFICE O/P EST MOD 30 MIN: CPT

## 2020-09-15 PROCEDURE — 83735 ASSAY OF MAGNESIUM: CPT

## 2020-09-15 NOTE — ED PROVIDER NOTE - NSFOLLOWUPINSTRUCTIONS_ED_ALL_ED_FT
1) Follow-up with your PCP in 1-2 days.     Follow-up with your cardiologist Dr. Jin or in the [facility] cardiology clinic in 2-3 days.    2) Rest. Take all medications as prescribed.    3) Read all discharge instructions below.    You were seen in the emergency department for palpitations.     An evaluation that was performed today did not show that you had a dangerous or life threatening cause of your symptoms.     Please make an appointment to see your doctor in the next several days for a complete exam. If you have chest pain, dizziness, shortness of breath, numbness, profuse sweating, or pain that radiates to your arms or jaw - return to the ED promptly. Please ask your doctor for a referral for a stress test to evaluate the blood flow to your heart and possible risks of potential heart disease or heart attack    YOU MAY ALWAYS RETURN TO THE ED IF YOU FEEL SICK OR HAVE CONCERNS

## 2020-09-15 NOTE — ASSESSMENT
[FreeTextEntry1] : 30 year old female w/ positive MICK 1:2560 centromere pattern and positive centromere AB (>8) and transiently (+) proteinase-3 AB.\par \par 1. Positive centromere antibody: c/o dysphagias and exertional dyspnea which is currently being addressed by GI. PFTs were normal in May 2019. Referral given to repeat testing. Denies any skin changes, Raynaud's. arthralgias etc. No clinical signs related to scleroderma at this time  i.e. telangiectasia, digital ulcers, NFC changes, synovitis, sclerodactyly. ECHO reviewed - was relatively normal with preserved function - referral given for repeat testing. Notices new PVCs - was not able to complete the Holter monitoring last time due to allergic reaction to the leads. Advised to follow up with cardiology. \par \par 2. Positive PR3 antibodies: subsequently negative. Initially was positive in low titers - likely artifact/false positive. No signs or symptoms of vasculitis at this time. Repeat labs today. \par \par 3. Dysphagia: a/w chronic diarrhea. F/u w/ GI to complete work up \par \par 4. Dizziness: pt previously referred for ENT evaluation. ? POTS versus orthostatic hypotension given h/o orthostatic symptoms a/w tachycardia in the 140s. F/u cardiology \par \par f/u in 3 months

## 2020-09-15 NOTE — ED PROVIDER NOTE - PATIENT PORTAL LINK FT
You can access the FollowMyHealth Patient Portal offered by [facility] by registering at the following website: [email or web address]. By joining [employer]’s FollowMyHealth portal, you will also be able to view your health information using other applications (apps) compatible with our system. You can access the FollowMyHealth Patient Portal offered by [facility] by registering at the following website: [email or web address]. By joining [facility]’s FollowMyHealth portal, you will also be able to view your health information using other applications (apps) compatible with our system.

## 2020-09-15 NOTE — ED PROVIDER NOTE - OBJECTIVE STATEMENT
30y F pmhx systemic scleroderma not on medications presents to ED c/o of palpitations since Saturday. 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 Reports sudden onset of palpitations at rest Saturday morning, described by pt as "heart flutter," occurring daily since, intermittent multiple times per hour, awakening pt from sleep last night, exacerbated by eating and on mild exertion, associated mild dizziness at times. No cp, SOB, cough, vision changes, n/v/d, abdominal pain, urinary complaints. Followed by misael, Dr. Jin, neg echo 3/2019.

## 2020-09-15 NOTE — PHYSICAL EXAM
[General Appearance - Alert] : alert [General Appearance - In No Acute Distress] : in no acute distress [Sclera] : the sclera and conjunctiva were normal [Outer Ear] : the ears and nose were normal in appearance [Oropharynx] : the oropharynx was normal [Neck Appearance] : the appearance of the neck was normal [Jugular Venous Distention Increased] : there was no jugular-venous distention [Neck Cervical Mass (___cm)] : no neck mass was observed [Thyroid Diffuse Enlargement] : the thyroid was not enlarged [Thyroid Nodule] : there were no palpable thyroid nodules [Auscultation Breath Sounds / Voice Sounds] : lungs were clear to auscultation bilaterally [Heart Rate And Rhythm] : heart rate was normal and rhythm regular [Heart Sounds] : normal S1 and S2 [Heart Sounds Gallop] : no gallops [Murmurs] : no murmurs [Heart Sounds Pericardial Friction Rub] : no pericardial rub [Edema] : there was no peripheral edema [Bowel Sounds] : normal bowel sounds [Abdomen Soft] : soft [Abdomen Tenderness] : non-tender [Abdomen Mass (___ Cm)] : no abdominal mass palpated [Cervical Lymph Nodes Enlarged Anterior Bilaterally] : anterior cervical [Cervical Lymph Nodes Enlarged Posterior Bilaterally] : posterior cervical [Supraclavicular Lymph Nodes Enlarged Bilaterally] : supraclavicular [No Spinal Tenderness] : no spinal tenderness [Skin Turgor] : normal skin turgor [Skin Color & Pigmentation] : normal skin color and pigmentation [] : no rash [No Focal Deficits] : no focal deficits [Impaired Insight] : insight and judgment were intact [Oriented To Time, Place, And Person] : oriented to person, place, and time [Affect] : the affect was normal [FreeTextEntry1] : No synovitis, full ROM in all joints\par

## 2020-09-15 NOTE — ED PROVIDER NOTE - CLINICAL SUMMARY MEDICAL DECISION MAKING FREE TEXT BOX
Ale Mata (MD): 30y F with PMHx of Scleroderma presents with intermittent palpitations x 4 days associated with dizziness. ROS otherwise negative. Plan to check EKG, labs to R/O electrolytes, anemia, dehydration, UA to R/O UTI, CXR and reassess. Ale Mata (MD): 30y F with PMHx of Scleroderma presents with intermittent palpitations x 4 days associated with dizziness. ROS otherwise negative. Plan to check EKG, labs to R/O electrolytes, anemia, dehydration,, CXR and reassess. Adolfo PEÑA: palpitations - ekg wnl. plan to check labs to r/o anemia, electrolyte abnormality, dehydration. Pt feels comfortable with outpatient follow up if no acute findings.

## 2020-09-15 NOTE — ED ADULT NURSE NOTE - CHPI ED NUR SYMPTOMS NEG
no congestion/no chills/no fever/no nausea/no vomiting/no dizziness/no chest pain/no back pain/no diaphoresis/no shortness of breath/no syncope

## 2020-09-15 NOTE — ED ADULT NURSE NOTE - OBJECTIVE STATEMENT
31 yo F w/ PMHx of systemic scleroderma presents to ED via waiting room c/o palpitations. Pt states symptoms began 3 days ago, have been consistently intermittent since that time. States symptoms seem to be worsened by exertion, but nothing resolves symptoms. Some perceived shortness of breath when palpitations are present. Reports experienced some mild L sided chest pain while laying on L side. Pt denies any current CP, SOB, cough, N/V, fever, chills, urinary complaints, constipation, diarrhea, HA, dizziness, weakness. Pt A&Ox4, lungs CTA, +central pulses. Abdomen soft, not tender, not distended. Ambulating w/ steady gait, safety and comfort maintained, no acute distress noted at this time. Pt denies any recent travel or known sick contacts.

## 2020-09-15 NOTE — HISTORY OF PRESENT ILLNESS
[___ Month(s) Ago] : [unfilled] month(s) ago [Currently Experiencing] : currently [Anorexia] : no anorexia [Weight Loss] : no weight loss [Fever] : no fever [Malaise] : no malaise [Chills] : no chills [Fatigue] : no fatigue [Depression] : no depression [Malar Facial Rash] : no malar facial rash [Skin Lesions] : no lesions [Oral Ulcers] : no oral ulcers [Skin Nodules] : no skin nodules [Cough] : no cough [Dysphonia] : no dysphonia [Dry Mouth] : no dry mouth [Dysphagia] : no dysphagia [Chest Pain] : no chest pain [Shortness of Breath] : no shortness of breath [Joint Swelling] : no joint swelling [Arthralgias] : no arthralgias [Joint Warmth] : no joint warmth [Joint Deformity] : no joint deformity [Decreased ROM] : no decreased range of motion [Morning Stiffness] : no morning stiffness [Falls] : no falls [Difficulty Standing] : no difficulty standing [Difficulty Walking] : no difficulty walking [Myalgias] : no myalgias [Dyspnea] : no dyspnea [Muscle Weakness] : no muscle weakness [Muscle Spasms] : no muscle spasms [Visual Changes] : no visual changes [Muscle Cramping] : no muscle cramping [Eye Pain] : no eye pain [Eye Redness] : no eye redness [Dry Eyes] : no dry eyes [FreeTextEntry1] : Manometry and motility studies pending. has some allergies but the rashes have resolved. No skin tightening, joint pain and swelling, rashes. No sicca symptoms. Has been noticing some skipped beats with breathlessness lasting a few minutes on and off since last Saturday - also some dizziness associated with it. Took home EKG reading with indicated some PVCs.

## 2020-09-15 NOTE — ED PROVIDER NOTE - NS_ ATTENDINGSCRIBEDETAILS _ED_A_ED_FT
I,Ale Mata, performed the initial face to face bedside interview with this patient regarding history of present illness, review of symptoms and relevant past medical, social and family history.  I completed an independent physical examination.  I was the initial provider who evaluated this patient. The history, relevant review of systems, past medical and surgical history, medical decision making, and physical examination was documented by the scribe in my presence and I attest to the accuracy of the documentation.

## 2020-09-15 NOTE — REVIEW OF SYSTEMS
[Shortness Of Breath] : shortness of breath [SOB on Exertion] : shortness of breath during exertion [Diarrhea] : diarrhea [As Noted in HPI] : as noted in HPI [Negative] : Heme/Lymph [Wheezing] : no wheezing [Cough] : no cough [Vomiting] : no vomiting [Abdominal Pain] : no abdominal pain [Constipation] : no constipation [Heartburn] : no heartburn [Melena] : no melena

## 2020-09-15 NOTE — ED PROVIDER NOTE - ATTENDING CONTRIBUTION TO CARE
Patient is a 29 yo F with history of scleroderma, not on any medications here for intermittent palpitations x 4 days. Symptoms associated with dizziness. Denies chest pain, shortness of breath, nausea, vomiting, diarrhea. No urinary symptoms. Patient reports a normal echo last March and has a script for outpatient echo. Denies symptoms during interview but reports it happened while here.     VS noted  Gen. no acute distress, Non toxic   HEENT: EOMI, mmm  Lungs: CTAB/L no C/ W /R   CVS: RRR   Abd; Soft non tender, non distended   Ext: no edema  Skin: no rash  Neuro AAOx3 non focal clear speech  a/p: palpitations - ekg wnl. plan to check labs to r/o anemia, electrolyte abnormality, dehydration. Pt feels comfortable with outpatient follow up if no acute findings.   - Adolfo PEÑA

## 2020-09-15 NOTE — ED PROVIDER NOTE - NSFOLLOWUPCLINICS_GEN_ALL_ED_FT
[facility]  Cardiology  [address] [place] [id number]  Phone: [phone]  Fax:   Follow Up Time:

## 2020-09-18 ENCOUNTER — APPOINTMENT (OUTPATIENT)
Dept: CARDIOLOGY | Facility: CLINIC | Age: 30
End: 2020-09-18
Payer: COMMERCIAL

## 2020-09-18 PROBLEM — M34.9 SYSTEMIC SCLEROSIS, UNSPECIFIED: Chronic | Status: ACTIVE | Noted: 2020-09-15

## 2020-09-18 PROCEDURE — ZZZZZ: CPT

## 2020-09-22 ENCOUNTER — APPOINTMENT (OUTPATIENT)
Dept: CARDIOLOGY | Facility: CLINIC | Age: 30
End: 2020-09-22
Payer: COMMERCIAL

## 2020-09-22 ENCOUNTER — NON-APPOINTMENT (OUTPATIENT)
Age: 30
End: 2020-09-22

## 2020-09-22 VITALS
OXYGEN SATURATION: 100 % | BODY MASS INDEX: 25.95 KG/M2 | SYSTOLIC BLOOD PRESSURE: 109 MMHG | RESPIRATION RATE: 17 BRPM | DIASTOLIC BLOOD PRESSURE: 66 MMHG | HEIGHT: 62 IN | HEART RATE: 77 BPM | WEIGHT: 141 LBS | TEMPERATURE: 98.1 F

## 2020-09-22 PROCEDURE — 93000 ELECTROCARDIOGRAM COMPLETE: CPT | Mod: 59

## 2020-09-22 PROCEDURE — 99214 OFFICE O/P EST MOD 30 MIN: CPT

## 2020-09-22 NOTE — HISTORY OF PRESENT ILLNESS
[FreeTextEntry1] : Patient returned back to the office today having not seen me in over a year. She has no real reason she did not come back but apparently there was some confusion and she forgot. She recently followed up with dermatology and was told that she may in fact have scleroderma. She apparently has some positive blood work but no real symptoms. She does seem to be developing Raynaud's recently. A week ago Friday the patient reports having started to develop palpitations. These were associated with dizziness and some very brief shortness of breath. The symptoms started out somewhat intermittently but have increased over time and now occurring in significant portions of the day. It seems to occur more at night and be worse when she lies down. She noted some PVCs on her watch and up to a monitor where she works and also saw PVCs and possibly some NSVT. She reports no presyncope or syncope. No other specific symptoms. Patient denies chest pain, shortness of breath, orthopnea.

## 2020-09-22 NOTE — DISCUSSION/SUMMARY
[FreeTextEntry1] : 1. If her Holter monitor is unrevealing we'll consider a one-week Holter monitor to further evaluate her palpitations and ventricular ectopy.\par 2. Echocardiogram given her diagnosis of scleroderma and her palpitations.\par 3. Based on the results of the above testing we'll consider whether she would benefit from a beta blocker.\par 4. Patient encouraged  to find ways to reduce stress, such as meditation and/or yoga.\par 5. Patient is encouraged to exercise at least 30 minutes a day everyday of the week.\par 6. Follow up with rheumatology.\par 7. Follow up here after testing, and will make further recommendations at that time.

## 2020-09-22 NOTE — ASSESSMENT
[FreeTextEntry1] : Echocardiogram March 13, 2019 demonstrated left vaginal normal doesn't function with ejection fraction of 55-60%. This point insufficiency was noted but no significant structural abnormalities.\par \par Event monitor was scheduled for March 15 and 13th but she was only able to wear it from March 15 to March 22 intermittently. She did have some episodes while wearing the monitor but only sinus rhythm with no ectopy was noted.\par \par EKG: Sinus rhythm with no significant ST or T wave changes.\par \par 30-year-old female with no clear significant past medical history of pituitary adenoma and presents to me for f/u. Patient presents back with complaints of palpitations and PVCs recently. She may also have some NSVT. Some of her palpitations may ultimately be related to anxiety and stress. She did have a Holter monitor but I do not have the result. She reports less symptoms while wearing the monitor and we may need to consider additional monitoring. Ultimately she may benefit from a beta blocker but I would like to do further testing before making that decision. She will certainly let me know she has any presyncope or syncope. She also apparently has been diagnosed with possible scleroderma and was requested by her rheumatologist have a repeat echocardiogram.

## 2020-12-09 ENCOUNTER — APPOINTMENT (OUTPATIENT)
Dept: CARDIOLOGY | Facility: CLINIC | Age: 30
End: 2020-12-09
Payer: COMMERCIAL

## 2020-12-09 PROCEDURE — 99072 ADDL SUPL MATRL&STAF TM PHE: CPT

## 2020-12-09 PROCEDURE — 93306 TTE W/DOPPLER COMPLETE: CPT

## 2020-12-15 ENCOUNTER — APPOINTMENT (OUTPATIENT)
Dept: RHEUMATOLOGY | Facility: CLINIC | Age: 30
End: 2020-12-15

## 2021-01-18 ENCOUNTER — RESULT REVIEW (OUTPATIENT)
Age: 31
End: 2021-01-18

## 2021-01-21 ENCOUNTER — APPOINTMENT (OUTPATIENT)
Dept: CARDIOLOGY | Facility: CLINIC | Age: 31
End: 2021-01-21
Payer: COMMERCIAL

## 2021-01-21 ENCOUNTER — NON-APPOINTMENT (OUTPATIENT)
Age: 31
End: 2021-01-21

## 2021-01-21 VITALS
TEMPERATURE: 97.2 F | HEART RATE: 72 BPM | RESPIRATION RATE: 16 BRPM | SYSTOLIC BLOOD PRESSURE: 102 MMHG | DIASTOLIC BLOOD PRESSURE: 71 MMHG | BODY MASS INDEX: 26.5 KG/M2 | WEIGHT: 144 LBS | HEIGHT: 62 IN | OXYGEN SATURATION: 97 %

## 2021-01-21 DIAGNOSIS — R00.2 PALPITATIONS: ICD-10-CM

## 2021-01-21 DIAGNOSIS — I49.3 VENTRICULAR PREMATURE DEPOLARIZATION: ICD-10-CM

## 2021-01-21 DIAGNOSIS — R42 DIZZINESS AND GIDDINESS: ICD-10-CM

## 2021-01-21 PROCEDURE — 93000 ELECTROCARDIOGRAM COMPLETE: CPT

## 2021-01-21 PROCEDURE — 99214 OFFICE O/P EST MOD 30 MIN: CPT

## 2021-01-21 PROCEDURE — 99072 ADDL SUPL MATRL&STAF TM PHE: CPT

## 2021-01-21 NOTE — DISCUSSION/SUMMARY
[FreeTextEntry1] : 1. No additional cardiac testing at this time.\par 2. No additional cardiac medications at this time.\par 3. Patient encouraged  to find ways to reduce stress, such as meditation and/or yoga.\par 4. Patient is encouraged to exercise at least 30 minutes a day everyday of the week.\par 5. Follow up with rheumatology.\par 6. Repeat blood work with her primary\par 7. Follow up here in one year or as needed.

## 2021-01-21 NOTE — ASSESSMENT
[FreeTextEntry1] : Echocardiogram December 9, 2020 demonstrated left ventricle normal size and function with ejection fraction of 60 to 65%.  Trace mitral and pulmonic regurgitation noted.  No significant structural abnormalities.\par \par Holter monitor September 18, 2020 demonstrated presenting rhythm of sinus with an average heart rate of 74 bpm, maximum 119, minimum 58 bpm.  Frequent PVCs totaling 74/h with 2 couplets noted.  Multiple patient events associated with sinus rhythm with occasional PVCs.\par \par EKG: Sinus rhythm with no significant ST or T wave changes.\par \par 30-year-old female with no clear significant past medical history of pituitary adenoma and presents to me for f/u.  Patient is feeling better since last I saw her.  She is having less palpitations and no further dizziness.  Holter monitor did show fairly frequent PVCs but not an excessive amount and I do not believe this needs to be treated with any medication.  Echocardiogram was unremarkable with a normal EF.  At this time she appears to be very stable from a cardiac standpoint.  Blood pressure is normal.

## 2021-01-21 NOTE — HISTORY OF PRESENT ILLNESS
[FreeTextEntry1] : Patient presents back today feeling better.  She is having less palpitations and no dizziness at all at this point.  She has been doing some exercise and is able to do that with no limitations.  No other new symptoms at this time.  She continues to have issues with Raynaud's but no other obvious evidence of scleroderma and that diagnosis has not yet been clearly made.  Patient denies chest pain, shortness of breath, orthopnea, presyncope, syncope.

## 2021-06-15 ENCOUNTER — NON-APPOINTMENT (OUTPATIENT)
Age: 31
End: 2021-06-15

## 2021-06-16 ENCOUNTER — TRANSCRIPTION ENCOUNTER (OUTPATIENT)
Age: 31
End: 2021-06-16

## 2021-06-23 ENCOUNTER — APPOINTMENT (OUTPATIENT)
Dept: PEDIATRIC ALLERGY IMMUNOLOGY | Facility: CLINIC | Age: 31
End: 2021-06-23

## 2021-08-05 ENCOUNTER — NON-APPOINTMENT (OUTPATIENT)
Age: 31
End: 2021-08-05

## 2021-08-11 ENCOUNTER — APPOINTMENT (OUTPATIENT)
Dept: PEDIATRIC ALLERGY IMMUNOLOGY | Facility: CLINIC | Age: 31
End: 2021-08-11
Payer: COMMERCIAL

## 2021-08-11 VITALS
BODY MASS INDEX: 26.13 KG/M2 | HEIGHT: 62 IN | HEART RATE: 93 BPM | OXYGEN SATURATION: 99 % | TEMPERATURE: 96.3 F | SYSTOLIC BLOOD PRESSURE: 119 MMHG | WEIGHT: 142 LBS | DIASTOLIC BLOOD PRESSURE: 79 MMHG

## 2021-08-11 PROCEDURE — 95076 INGEST CHALLENGE INI 120 MIN: CPT

## 2021-08-11 RX ORDER — EPINEPHRINE 0.3 MG/.3ML
0.3 INJECTION INTRAMUSCULAR
Qty: 1 | Refills: 2 | Status: DISCONTINUED | COMMUNITY
Start: 2020-09-04 | End: 2021-08-11

## 2021-08-17 NOTE — PHYSICAL EXAM
[Alert] : alert [Well Nourished] : well nourished [Healthy Appearance] : healthy appearance [No Acute Distress] : no acute distress [Well Developed] : well developed [Normal Pupil & Iris Size/Symmetry] : normal pupil and iris size and symmetry [No Discharge] : no discharge [No Photophobia] : no photophobia [Sclera Not Icteric] : sclera not icteric [Normal Nasal Mucosa] : the nasal mucosa was normal [Normal Lips/Tongue] : the lips and tongue were normal [Normal Outer Ear/Nose] : the ears and nose were normal in appearance [Normal Tonsils] : normal tonsils [No Thrush] : no thrush [Pale mucosa] : no pale mucosa [Supple] : the neck was supple [Normal Rate and Effort] : normal respiratory rhythm and effort [No Crackles] : no crackles [Bilateral Audible Breath Sounds] : bilateral audible breath sounds [No Retractions] : no retractions [Wheezing] : no wheezing was heard [Normal Rate] : heart rate was normal  [Normal S1, S2] : normal S1 and S2 [No murmur] : no murmur [Regular Rhythm] : with a regular rhythm [Skin Intact] : skin intact  [No Rash] : no rash [No Skin Lesions] : no skin lesions [No clubbing] : no clubbing [No Edema] : no edema [No Cyanosis] : no cyanosis [Normal Mood] : mood was normal [Normal Affect] : affect was normal [Judgment and Insight Age Appropriate] : judgement and insight is age appropriate [Alert, Awake, Oriented as Age-Appropriate] : alert, awake, oriented as age appropriate

## 2021-08-17 NOTE — PROCEDURE
[Patient ingested ___ amount of allergen] : Patient ingested [unfilled] amount of allergen [FreeTextEntry1] : Ms. Suh was challenged to shrimp orally in the office following office protocols and step wise graded challenge as outline above.\par Ms. Suh tolerated the challenge with no problems. [Pass] : Challenge: Pass

## 2021-08-17 NOTE — PLAN
[FreeTextEntry1] : SHRIMP ALLERGY:\par Resolved.\par The patient was challenged to SHRIMP in the office as per protocol.  The patient was able to tolerate a full portion size of shrimp with no issues as stated in the scanned documents in the chart.   I discussed that shrimp should be incorporated into the diet as tolerated several times a week.  \par \par

## 2021-08-17 NOTE — CONSULT LETTER
[Dear  ___] : Dear  [unfilled], [Courtesy Letter:] : I had the pleasure of seeing your patient, [unfilled], in my office today. [Please see my note below.] : Please see my note below. [Consult Closing:] : Thank you very much for allowing me to participate in the care of this patient.  If you have any questions, please do not hesitate to contact me. [Sincerely,] : Sincerely, [FreeTextEntry2] : Dr. Haider Anderson [FreeTextEntry3] : Supriya Mejia MD, FAAAAI, FACMORENOI\par Associate , \par Assistant Fellowship Training ,\par Director, [facility]\par Division of Allergy and Immunology\par [facility]\par [facility]\par , Pediatrics and Medicine\par [facility]\par [address], [address]\[place] [address]\par [phone]\par

## 2021-08-17 NOTE — DISCUSSION/SUMMARY
[Patient has passed challenge.  Patient can incorporate ___ in to regular diet.] : Patient has passed challenge.  Patient can incorporate [unfilled] in to ~his/her~ regular diet
57

## 2021-08-17 NOTE — HISTORY OF PRESENT ILLNESS
[Consent obtained and signed form scanned in to chart] : Consent obtained and signed form scanned in to chart [] : The following medications are to be available during the challenge procedure: [Diphenhydramine] : Diphenhydramine, 1-2mg/kg IM (max dose 50mg), (50mg/1 cc) [Solucortef] : Solucortef, 4-8 mg/kg IM (max dose 200 mg), (100mg/2 cc) [___ mg] : Dose: [unfilled] mg [Epinephrine 1:1000 IM] : Epinephrine 1:1000 IM, 0.01cc/kg (max dose 0.5 cc) [___ cc] : Volume: [unfilled] cc [Albuterol MDI] : Albuterol MDI, 2 - 4 puffs [Albuterol nebulized] : Albuterol nebulized, 0.083% [___] : HR: [unfilled]  [_______] : Time: [unfilled] [Clear] : Skin Findings: Clear [No] : Reaction: No [____] : IVB: [unfilled] [___] : Amount: [unfilled] [___% 1) Skin -  A) Erythematous rash - % area involved] : Erythematous Rash (IA): [unfilled] % area involved [0 Pruritus: 0  - absent] : Pruritus (IB): 0 - absent [0 Urticaria/Angioedema: 0 - Absent] : Urticaria/Angioedema (IC): 0  - Absent [0 Rash: 0 - Absent] : Rash (ID): 0 - Absent [0 Sneezing/Itchin - Absent] : Sneezing/Itching (IIA): 0 - Absent [0 Nasal congestion: 0 - Absent] : Nasal congestion (IIB): 0 - Absent [0 Rhinorrhea: 0 - Absent] : Rhinorrhea (IIC): 0 - Absent [0 Laryngeal: 0 - Absent] : Laryngeal (IID): 0 - Absent [0 Wheezin - Absent] : Wheezing (IIIA): 0 - Absent [0 Gastro-Subjective complaints: 0 - Absent] : Gastro-Subjective Complaints (CHIP): 0 - Absent [0 Gastro-Objective complaints: 0 - Absent] : Gastro-Objective Complaints (IVB): 0 - Absent [Antihistamine use in past 5 days] : No antihistamine use in past 5 days [Recent Illness] : no recent illness [Fever] : no fever [Asthma] : no asthma [de-identified] : Feeling well, no accidental exposures to shrimp. Coming in for a shrimp challenge. No history of known triggered reaction but was avoiding shellfish due to laboratory testing and an unassociated rash that was present at the time of testing. Current laboratory testing Shrimp IgE 1.03 and skin test 4 mm. [FreeTextEntry1] : Shrimp [FreeTextEntry2] : 3 oz/ 87 g

## 2021-12-14 ENCOUNTER — APPOINTMENT (OUTPATIENT)
Dept: INFECTIOUS DISEASE | Facility: CLINIC | Age: 31
End: 2021-12-14
Payer: SELF-PAY

## 2021-12-14 DIAGNOSIS — Z71.84 ENC FOR HEALTH COUNSELING RELATED TO TRAVEL: ICD-10-CM

## 2021-12-14 PROCEDURE — 99401 PREV MED CNSL INDIV APPRX 15: CPT | Mod: 25

## 2021-12-14 PROCEDURE — 90471 IMMUNIZATION ADMIN: CPT

## 2021-12-14 PROCEDURE — 99072 ADDL SUPL MATRL&STAF TM PHE: CPT

## 2021-12-14 PROCEDURE — 90691 TYPHOID VACCINE IM: CPT

## 2022-01-07 ENCOUNTER — EMERGENCY (EMERGENCY)
Facility: HOSPITAL | Age: 32
LOS: 1 days | Discharge: HOME | End: 2022-01-07
Attending: EMERGENCY MEDICINE
Payer: MEDICARE

## 2022-01-07 VITALS
HEART RATE: 98 BPM | HEIGHT: 62 IN | DIASTOLIC BLOOD PRESSURE: 46 MMHG | SYSTOLIC BLOOD PRESSURE: 110 MMHG | OXYGEN SATURATION: 98 % | TEMPERATURE: 98 F | RESPIRATION RATE: 18 BRPM

## 2022-01-07 LAB — SARS-COV-2 RNA SPEC QL NAA+PROBE: SIGNIFICANT CHANGE UP

## 2022-01-07 PROCEDURE — 99053 MED SERV 10PM-8AM 24 HR FAC: CPT

## 2022-01-07 PROCEDURE — 87635 SARS-COV-2 COVID-19 AMP PRB: CPT

## 2022-01-07 PROCEDURE — 99283 EMERGENCY DEPT VISIT LOW MDM: CPT

## 2022-01-07 PROCEDURE — 99282 EMERGENCY DEPT VISIT SF MDM: CPT

## 2022-01-07 NOTE — ED PROVIDER NOTE - PATIENT PORTAL LINK FT
You can access the FollowMyHealth Patient Portal offered by [facility] by registering at the following website: [email or web address]. By joining [email or web address]’s FollowMyHealth portal, you will also be able to view your health information using other applications (apps) compatible with our system.

## 2022-01-07 NOTE — ED PROVIDER NOTE - CLINICAL SUMMARY MEDICAL DECISION MAKING FREE TEXT BOX
Pt here for covid swab, asymptomatic, well appearing. swab sent. no other emergent pathology, ok to d/c.

## 2022-01-07 NOTE — ED PROVIDER NOTE - NSFOLLOWUPINSTRUCTIONS_ED_ALL_ED_FT
You were swabbed for covid today.    Take Tylenol as needed for pain or fever.    Return to the ED if you have trouble breathing, fever, chest pain, vomiting or any other major concern.

## 2022-01-07 NOTE — ED ADULT NURSE NOTE - OBJECTIVE STATEMENT
Pt is a 31y F no sig PMH presents to ED for COVID swab. Pt is asymptomatic at this time. Denies fevers, SOB, chills, N/V, other flu-like symptoms. A&Ox4, BURNS, lungs clear, distal pulses intact, abdomen soft, skin intact. Pt safety maintained.

## 2022-01-07 NOTE — ED ADULT NURSE NOTE - NSIMPLEMENTINTERV_GEN_ALL_ED
Implemented All Universal Safety Interventions:  [place] to call system. Call bell, personal items and telephone within reach. Instruct patient to call for assistance. Room bathroom lighting operational. Non-slip footwear when patient is off stretcher. Physically safe environment: no spills, clutter or unnecessary equipment. Stretcher in lowest position, wheels locked, appropriate side rails in place.

## 2022-01-07 NOTE — ED PROVIDER NOTE - PHYSICAL EXAMINATION
GEN: Well Appearing, Nontoxic, NAD  HEENT: NC/AT, MMM  Neck: supple  CV: reg rate  RESP: normal WOB, no distress  ABD: non-distended  EXT/MSK:  FROMx4. No lower extremity edema.  SKIN: well perfused  Neuro: Grossly intact, AOX3

## 2022-01-07 NOTE — ED PROVIDER NOTE - OBJECTIVE STATEMENT
Pt is a healthy 30 yo F here for a covid swab for travel purposes. pt is completely asymptomatic and denies fever, n/v, sob, cough.

## 2022-02-15 ENCOUNTER — NON-APPOINTMENT (OUTPATIENT)
Age: 32
End: 2022-02-15

## 2022-02-15 ENCOUNTER — APPOINTMENT (OUTPATIENT)
Dept: FAMILY MEDICINE | Facility: CLINIC | Age: 32
End: 2022-02-15
Payer: COMMERCIAL

## 2022-02-15 VITALS
HEIGHT: 62 IN | HEART RATE: 59 BPM | RESPIRATION RATE: 16 BRPM | DIASTOLIC BLOOD PRESSURE: 77 MMHG | OXYGEN SATURATION: 99 % | WEIGHT: 142 LBS | BODY MASS INDEX: 26.13 KG/M2 | SYSTOLIC BLOOD PRESSURE: 113 MMHG | TEMPERATURE: 97.8 F

## 2022-02-15 VITALS — DIASTOLIC BLOOD PRESSURE: 62 MMHG | SYSTOLIC BLOOD PRESSURE: 90 MMHG

## 2022-02-15 DIAGNOSIS — Z83.79 FAMILY HISTORY OF OTHER DISEASES OF THE DIGESTIVE SYSTEM: ICD-10-CM

## 2022-02-15 DIAGNOSIS — Z80.3 FAMILY HISTORY OF MALIGNANT NEOPLASM OF BREAST: ICD-10-CM

## 2022-02-15 DIAGNOSIS — Z23 ENCOUNTER FOR IMMUNIZATION: ICD-10-CM

## 2022-02-15 DIAGNOSIS — Z82.61 FAMILY HISTORY OF ARTHRITIS: ICD-10-CM

## 2022-02-15 DIAGNOSIS — R76.8 OTHER SPECIFIED ABNORMAL IMMUNOLOGICAL FINDINGS IN SERUM: ICD-10-CM

## 2022-02-15 DIAGNOSIS — Z80.51 FAMILY HISTORY OF MALIGNANT NEOPLASM OF KIDNEY: ICD-10-CM

## 2022-02-15 PROCEDURE — 93000 ELECTROCARDIOGRAM COMPLETE: CPT

## 2022-02-15 PROCEDURE — 36415 COLL VENOUS BLD VENIPUNCTURE: CPT

## 2022-02-15 PROCEDURE — 99385 PREV VISIT NEW AGE 18-39: CPT | Mod: 25

## 2022-02-15 RX ORDER — ATOVAQUONE AND PROGUANIL HYDROCHLORIDE 250; 100 MG/1; MG/1
250-100 TABLET, FILM COATED ORAL DAILY
Qty: 32 | Refills: 0 | Status: DISCONTINUED | COMMUNITY
Start: 2021-12-14 | End: 2022-02-15

## 2022-02-15 NOTE — ASSESSMENT
[FreeTextEntry1] : Physical Exam:\par Constitutional: No acute distress, well appearing\par HEENT: Normocephalic, atraumatic\par Neck: supple\par Cardiac: S1S2, Regular rate and rhythm, No murmurs\par Pulmonary: No respiratory distress, Lungs clear to auscultation bilaterally, no wheezing, rales, or rhonchi\par Abdomen: Soft, non-tender, non-distended, no guarding, normal bowel sounds\par Vascular: No peripheral edema\par Neurology: Coordination grossly intact, no focal deficits\par Psychiatric: Alert and oriented x3, normal mood\par \par \par \par a/P:\par HCM:\par - f/u bloodwork drawn in office, will call w/ results\par - flu- 10/2021.\par - TDAP- 01/2016\par - cervical CA screening- UTD\par - EKG wnl\par \par hx abnormal rheum bloodwork:\par has not been diagnosed with wegeners or scleroderma \par - was advised to f/u w/ rheum regularly \par - any further rheum bloodwork should come from rheum\par \par pituitary adenoma \par asymptomatic\par - will obtain prolactin but she knows that if abnormal, she will need to f/u w/ her neurologist\par

## 2022-02-15 NOTE — HEALTH RISK ASSESSMENT
[Never] : Never [Yes] : Yes [Monthly or less (1 pt)] : Monthly or less (1 point) [1 or 2 (0 pts)] : 1 or 2 (0 points) [Never (0 pts)] : Never (0 points) [No] : In the past 12 months have you used drugs other than those required for medical reasons? No [0] : 2) Feeling down, depressed, or hopeless: Not at all (0) [PHQ-2 Negative - No further assessment needed] : PHQ-2 Negative - No further assessment needed [Patient reported PAP Smear was normal] : Patient reported PAP Smear was normal [HIV Test offered] : HIV Test offered [Hepatitis C test offered] : Hepatitis C test offered [With Family] : lives with family [Employed] : employed [Single] : single [Audit-CScore] : 1 [ALR5Eiirl] : 0 [PapSmearDate] : 01/2021 [PapSmearComments] : Dr. Sharon Moscoso.  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

## 2022-02-15 NOTE — HISTORY OF PRESENT ILLNESS
[FreeTextEntry1] : Patient is here for CPE [de-identified] : 31y F w/ PMHx pituitary adenoma, presenting for CPE/establish care. \par \par concern in the past over possible scleroderma after her old PCP screened her via bloodwork. she has seen at least 2 different rheumatologists who specialize in vasculitis and was told that she has no clinical manifestations of it. bloodwork has been negative on some repeat values. saw cardio Dr. Jona Jin- cardiac workup negative, results in chart. they were also assessing her for possible Wegners vasculitis which was also checked by her previous PCP. so far all results have been negative.  \par \par had episodes of syncope in the past and was evaluated by neuro. incidentally found to have pituitary adenoma on imaging. she is seeing neurology Dr. Mathias at [facility]. she was having lactation at the time but was told no intervention required. it resolved. currently denies current vision changes, headaches, lactation. requesting to have prolactin levels checked. \par \par she takes vitamin B complex once very 3 days. \par

## 2022-02-16 LAB
25(OH)D3 SERPL-MCNC: 31.9 NG/ML
ALBUMIN SERPL ELPH-MCNC: 4.6 G/DL
ALP BLD-CCNC: 54 U/L
ALT SERPL-CCNC: 18 U/L
ANION GAP SERPL CALC-SCNC: 10 MMOL/L
AST SERPL-CCNC: 16 U/L
BASOPHILS # BLD AUTO: 0.04 K/UL
BASOPHILS NFR BLD AUTO: 0.7 %
BILIRUB SERPL-MCNC: 0.4 MG/DL
BUN SERPL-MCNC: 16 MG/DL
CALCIUM SERPL-MCNC: 9.2 MG/DL
CHLORIDE SERPL-SCNC: 102 MMOL/L
CHOLEST SERPL-MCNC: 192 MG/DL
CO2 SERPL-SCNC: 26 MMOL/L
CREAT SERPL-MCNC: 0.68 MG/DL
EOSINOPHIL # BLD AUTO: 0.1 K/UL
EOSINOPHIL NFR BLD AUTO: 1.7 %
ESTIMATED AVERAGE GLUCOSE: 94 MG/DL
FOLATE SERPL-MCNC: 7.6 NG/ML
GLUCOSE SERPL-MCNC: 85 MG/DL
HBA1C MFR BLD HPLC: 4.9 %
HCT VFR BLD CALC: 44.5 %
HCV AB SER QL: NONREACTIVE
HCV S/CO RATIO: 0.1 S/CO
HDLC SERPL-MCNC: 74 MG/DL
HGB BLD-MCNC: 14.2 G/DL
HIV1+2 AB SPEC QL IA.RAPID: NONREACTIVE
IMM GRANULOCYTES NFR BLD AUTO: 0.3 %
LDLC SERPL CALC-MCNC: 108 MG/DL
LYMPHOCYTES # BLD AUTO: 1.99 K/UL
LYMPHOCYTES NFR BLD AUTO: 33.1 %
MAN DIFF?: NORMAL
MCHC RBC-ENTMCNC: 28.9 PG
MCHC RBC-ENTMCNC: 31.9 GM/DL
MCV RBC AUTO: 90.4 FL
MONOCYTES # BLD AUTO: 0.39 K/UL
MONOCYTES NFR BLD AUTO: 6.5 %
NEUTROPHILS # BLD AUTO: 3.47 K/UL
NEUTROPHILS NFR BLD AUTO: 57.7 %
NONHDLC SERPL-MCNC: 118 MG/DL
PLATELET # BLD AUTO: 296 K/UL
POTASSIUM SERPL-SCNC: 4.2 MMOL/L
PROLACTIN SERPL-MCNC: 19 NG/ML
PROT SERPL-MCNC: 7.1 G/DL
RBC # BLD: 4.92 M/UL
RBC # FLD: 11.9 %
SODIUM SERPL-SCNC: 139 MMOL/L
T4 FREE SERPL-MCNC: 1.3 NG/DL
TRIGL SERPL-MCNC: 49 MG/DL
TSH SERPL-ACNC: 2.48 UIU/ML
VIT B12 SERPL-MCNC: 472 PG/ML
WBC # FLD AUTO: 6.01 K/UL

## 2022-02-17 ENCOUNTER — NON-APPOINTMENT (OUTPATIENT)
Age: 32
End: 2022-02-17

## 2022-03-25 ENCOUNTER — APPOINTMENT (OUTPATIENT)
Dept: PSYCHIATRY | Facility: CLINIC | Age: 32
End: 2022-03-25
Payer: COMMERCIAL

## 2022-03-25 PROCEDURE — 90791 PSYCH DIAGNOSTIC EVALUATION: CPT | Mod: 95

## 2022-03-29 ENCOUNTER — APPOINTMENT (OUTPATIENT)
Dept: PSYCHIATRY | Facility: CLINIC | Age: 32
End: 2022-03-29
Payer: COMMERCIAL

## 2022-03-29 PROCEDURE — 90837 PSYTX W PT 60 MINUTES: CPT | Mod: 95

## 2022-04-04 ENCOUNTER — APPOINTMENT (OUTPATIENT)
Dept: PSYCHIATRY | Facility: CLINIC | Age: 32
End: 2022-04-04
Payer: COMMERCIAL

## 2022-04-04 PROCEDURE — 90837 PSYTX W PT 60 MINUTES: CPT | Mod: 95

## 2022-04-11 ENCOUNTER — APPOINTMENT (OUTPATIENT)
Dept: PSYCHIATRY | Facility: CLINIC | Age: 32
End: 2022-04-11
Payer: COMMERCIAL

## 2022-04-11 PROCEDURE — 90837 PSYTX W PT 60 MINUTES: CPT | Mod: 95

## 2022-04-19 NOTE — ED PROVIDER NOTE - CARDIAC, MLM
[facility]- Pediatric Dermatology  Dr. Rosita Ortiz, Dr. Livier Alcantar, Dr. Jory Perez, Dr. Rupal Sands, TEODORA Foss Dr., Dr. Ashlie Chowdary    Non Urgent  Nurse Triage Line; [phone]- Isabella and Grace NIXON Care Coordinators    Vidya (/Complex ) [phone]    If you need a prescription refill, please contact your pharmacy. Refills are approved or denied by our Physicians during normal business hours, Monday through Fridays  Per office policy, refills will not be granted if you have not been seen within the past year (or sooner depending on your child's condition)      Scheduling Information:   Pediatric Appointment Scheduling and Call Center [phone]   Radiology Scheduling- [phone]   Sedation Unit Scheduling- [phone]  [place] Scheduling- [facility] [phone]; Pediatric Dermatology Clinic [phone]  Main  Services: [phone]   Argentine: [phone]   Chinese: [phone]   Hmong/Uzbek/Judson: [phone]    Preadmission Nursing Department Fax Number: [phone] (Fax all pre-operative paperwork to this number)      For urgent matters arising during evenings, weekends, or holidays that cannot wait for normal business hours please call [phone] and ask for the Dermatology Resident On-Call to be paged.          
Normal rate, regular rhythm.  Heart sounds S1, S2.  No murmurs, rubs or gallops.

## 2022-04-29 ENCOUNTER — NON-APPOINTMENT (OUTPATIENT)
Age: 32
End: 2022-04-29

## 2022-04-29 ENCOUNTER — APPOINTMENT (OUTPATIENT)
Dept: PSYCHIATRY | Facility: CLINIC | Age: 32
End: 2022-04-29

## 2022-05-26 ENCOUNTER — NON-APPOINTMENT (OUTPATIENT)
Age: 32
End: 2022-05-26

## 2022-07-25 ENCOUNTER — RESULT REVIEW (OUTPATIENT)
Age: 32
End: 2022-07-25

## 2023-03-02 ENCOUNTER — APPOINTMENT (OUTPATIENT)
Dept: PSYCHIATRY | Facility: CLINIC | Age: 33
End: 2023-03-02
Payer: COMMERCIAL

## 2023-03-02 PROCEDURE — 90791 PSYCH DIAGNOSTIC EVALUATION: CPT

## 2023-03-05 NOTE — REASON FOR VISIT
[Other:___] : [unfilled] [[facility] Provider/Facility] : [facility] Provider/Facility [Patient] : Patient [FreeTextEntry5] : english [FreeTextEntry2] : increased stress and syptoms [FreeTextEntry1] : see above

## 2023-03-05 NOTE — FAMILY HISTORY
[FreeTextEntry1] : patient reports both of her parents with significant mental health issues. \par \par Her father a [place] vet dx with PTSD and CHAD\par \par Patient reflects on her feelings related to rejection when she spoke about her sexuality
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
## 2023-03-05 NOTE — RISK ASSESSMENT
[Identifies reasons for living] : identifies reasons for living [Clinical Interview] : Clinical Interview [Ability to cope with stress] : ability to cope with stress [FreeTextEntry1] : no risk at this time

## 2023-03-05 NOTE — PLAN
[Every ___ week(s)] : Psychotherapy: Every [unfilled] week(s) [Admit to Program     (Add Program Admission information to a new column in the Admit/Discharge Flowsheet)] : Admit to program

## 2023-03-05 NOTE — DISCUSSION/SUMMARY
[FreeTextEntry1] : Patient is a 26 year old female who presents with symptoms of increased depression and anxiety related to psychosocial stressors. Patient has struggled with symptoms of anxiety for many years increased stress regarding relationships and issues that have now become a factor at work\par \par patient will continue weekly face to face individual psychotherapy session to address symptoms of anxiety and depression associated with the variety of psychosocial stressors including work relationships her family\par \par Problem : anxiety and depression\par Goal: decreased symptoms, with mindfulness and use of coping skills \par \par Problem: interpersonal relationship struggles\par Goal: will use coping skills and mindfulness to become more aware of relationship dynamics \par \par Problem: CG fatigue\par Goal: decrease fatigue levels by utilizing self care techniques and coping skills

## 2023-03-15 ENCOUNTER — APPOINTMENT (OUTPATIENT)
Dept: PSYCHIATRY | Facility: CLINIC | Age: 33
End: 2023-03-15
Payer: COMMERCIAL

## 2023-03-15 PROCEDURE — 90837 PSYTX W PT 60 MINUTES: CPT

## 2023-03-16 NOTE — REASON FOR VISIT
[FreeTextEntry5] : english [Other:___] : [unfilled] [[facility] Provider/Facility] : [facility] Provider/Facility [Patient] : Patient [FreeTextEntry2] : increased stress and symptoms [FreeTextEntry1] : see above

## 2023-03-16 NOTE — RISK ASSESSMENT
[Clinical Interview] : Clinical Interview [Identifies reasons for living] : identifies reasons for living [Ability to cope with stress] : ability to cope with stress [FreeTextEntry1] : no risk at this time

## 2023-03-16 NOTE — PSYCHOSOCIAL ASSESSMENT
[None known] : None known [FreeTextEntry2] : hard worker, dedicated, insightful [FreeTextEntry3] : limited socialization due to work stressors [Yes (select details below)] : Have you ever experienced this type of event? Yes [Competitive and integrated employment] : Competitive and integrated employment [35 hours or more] : 35 hours or more [Financially stable] : financially stable [FreeTextEntry1] : works excessive hours

## 2023-03-16 NOTE — DISCUSSION/SUMMARY
[FreeTextEntry1] : Patient is a 32 year old female who presents with symptoms of increased depression and anxiety related to psychosocial stressors. Patient has struggled with symptoms of anxiety for many years increased stress regarding relationships and issues that have now become a factor at work\par \par patient will continue weekly face to face individual psychotherapy session to address symptoms of anxiety and depression associated with the variety of psychosocial stressors including work relationships her family\par \par Problem : anxiety and depression\par Goal: decreased symptoms, with mindfulness and use of coping skills \par \par Problem: interpersonal relationship struggles\par Goal: will use coping skills and mindfulness to become more aware of relationship dynamics \par \par Problem: CG fatigue\par Goal: decrease fatigue levels by utilizing self care techniques and coping skills

## 2023-03-16 NOTE — PLAN
[FreeTextEntry2] : Patient is a 32 year old female who presents with symptoms of increased depression and anxiety related to psychosocial stressors. Patient has struggled with symptoms of anxiety for many years increased stress regarding relationships and issues that have now become a factor at work\par \par patient will continue weekly face to face individual psychotherapy session to address symptoms of anxiety and depression associated with the variety of psychosocial stressors including work relationships her family\par \par Problem : anxiety and depression\par Goal: decreased symptoms, with mindfulness and use of coping skills \par \par Problem: interpersonal relationship struggles\par Goal: will use coping skills and mindfulness to become more aware of relationship dynamics \par \par Problem: CG fatigue\par Goal: decrease fatigue levels by utilizing self care techniques and coping skills [Cognitive and/or Behavior Therapy] : Cognitive and/or Behavior Therapy  [Psychoeducation] : Psychoeducation  [Skills training (all types)] : Skills training (all types)  [Supportive Therapy] : Supportive Therapy [de-identified] : The above named patient presents on time for her weekly session, she's alert and oriented, engages well, calm, cooperative, humorous in her tone, reflective, and insightful, seems  to benefit from support and time for ventilation \par \par Patient spends a majority of the session reviewing her relationship with her mother as well as incidents throughout her life which signify her caring role. She reflects on this role dating back to as early as [age] years old ,she also reflects on situations throughout her life which she seems to have to emotionally detach but then overly commits with some relationships \par \par Time was spent reviewing relationships and her overall management and attachment styles. \par \par This writer provided education and reviewed her caregiving role in many people's lives, this is pervasive not only in her workplace but in her social life as well \par \par No other needs or concerns noted at this time, have arranged to meet with patient again on the 22nd at 4:00 o'clock  [FreeTextEntry1] : weekly CBT, skill building and supportive counseling, review of need for trauma focused treatment [Admit to Program     (Add Program Admission information to a new column in the Admit/Discharge Flowsheet)] : Admit to program [Every ___ week(s)] : Psychotherapy: Every [unfilled] week(s) [FreeTextEntry4] : see above

## 2023-03-23 ENCOUNTER — NON-APPOINTMENT (OUTPATIENT)
Age: 33
End: 2023-03-23

## 2023-03-23 ENCOUNTER — APPOINTMENT (OUTPATIENT)
Dept: PSYCHIATRY | Facility: CLINIC | Age: 33
End: 2023-03-23

## 2023-03-30 ENCOUNTER — APPOINTMENT (OUTPATIENT)
Dept: PSYCHIATRY | Facility: CLINIC | Age: 33
End: 2023-03-30
Payer: COMMERCIAL

## 2023-03-30 PROCEDURE — 90837 PSYTX W PT 60 MINUTES: CPT

## 2023-03-30 NOTE — REASON FOR VISIT
[FreeTextEntry5] : english [Other:___] : [unfilled] [[facility] Provider/Facility] : [facility] Provider/Facility [Patient] : Patient [FreeTextEntry2] : increased stress and symptoms [FreeTextEntry1] : see above

## 2023-03-30 NOTE — PLAN
[FreeTextEntry2] : Patient is a 32 year old female who presents with symptoms of increased depression and anxiety related to psychosocial stressors. Patient has struggled with symptoms of anxiety for many years increased stress regarding relationships and issues that have now become a factor at work\par \par patient will continue weekly face to face individual psychotherapy session to address symptoms of anxiety and depression associated with the variety of psychosocial stressors including work relationships her family\par \par Problem : anxiety and depression\par Goal: decreased symptoms, with mindfulness and use of coping skills \par \par Problem: interpersonal relationship struggles\par Goal: will use coping skills and mindfulness to become more aware of relationship dynamics \par \par Problem: CG fatigue\par Goal: decrease fatigue levels by utilizing self care techniques and coping skills [Cognitive and/or Behavior Therapy] : Cognitive and/or Behavior Therapy  [Psychoeducation] : Psychoeducation  [Skills training (all types)] : Skills training (all types)  [Supportive Therapy] : Supportive Therapy [de-identified] : The above named patient presents on time for her weekly session, she's alert and oriented, engages well in conversation good eye contact ,calm and cooperative, pleasant, seems to benefit from support and time for ventilation, good insight, reflective, and open to explore her own stress response . \par \par  \par \par Patient spends time reviewing the history of her relationships and her interactions with others, this writer highlighted her focus on being a caregiving and the importance of being mindful in moving forward with interpersonal relationships. Psychoeducation provided regarding her career and how it has impacted how she interacts with others .Patient agreed, reviewed the importance of self awareness in moving forward \par \par No other needs or concerns noted at this time, reviewed the importance of effective communication and boundaries as well as self-care, will make next appt based on next week's availability and will offer appt time  [FreeTextEntry1] : weekly CBT, skill building and supportive counseling, review of need for trauma focused treatment [Admit to Program     (Add Program Admission information to a new column in the Admit/Discharge Flowsheet)] : Admit to program [Every ___ week(s)] : Psychotherapy: Every [unfilled] week(s) [FreeTextEntry4] : see above

## 2023-04-05 ENCOUNTER — APPOINTMENT (OUTPATIENT)
Dept: PSYCHIATRY | Facility: CLINIC | Age: 33
End: 2023-04-05
Payer: COMMERCIAL

## 2023-04-05 PROCEDURE — 90837 PSYTX W PT 60 MINUTES: CPT

## 2023-04-05 NOTE — RISK ASSESSMENT
[Clinical Interview] : Clinical Interview [Identifies reasons for living] : identifies reasons for living [FreeTextEntry1] : no risk at this time [Ability to cope with stress] : ability to cope with stress

## 2023-04-05 NOTE — REASON FOR VISIT
[FreeTextEntry5] : english [[facility] Provider/Facility] : [facility] Provider/Facility [Other:___] : [unfilled] [Patient] : Patient [FreeTextEntry2] : increased stress and symptoms [FreeTextEntry1] : see above

## 2023-04-05 NOTE — PLAN
[FreeTextEntry2] : Patient is a 32 year old female who presents with symptoms of increased depression and anxiety related to psychosocial stressors. Patient has struggled with symptoms of anxiety for many years increased stress regarding relationships and issues that have now become a factor at work\par \par patient will continue weekly face to face individual psychotherapy session to address symptoms of anxiety and depression associated with the variety of psychosocial stressors including work relationships her family\par \par Problem : anxiety and depression\par Goal: decreased symptoms, with mindfulness and use of coping skills \par \par Problem: interpersonal relationship struggles\par Goal: will use coping skills and mindfulness to become more aware of relationship dynamics \par \par Problem: CG fatigue\par Goal: decrease fatigue levels by utilizing self care techniques and coping skills [Cognitive and/or Behavior Therapy] : Cognitive and/or Behavior Therapy  [Psychoeducation] : Psychoeducation  [Skills training (all types)] : Skills training (all types)  [Supportive Therapy] : Supportive Therapy [FreeTextEntry1] : weekly CBT, skill building and supportive counseling, review of need for trauma focused treatment [de-identified] :  Patient presents on time for her weekly session, she's alert and oriented, engages well with good eye contact, seems to benefit from support and time for ventilation as well as review of her own stress reaction, history of interpersonal relationships and goals for the future \par \par Patient spends time reviewing time with family over the weekend which has been stressful, she's planning to be away with them in the next week therefore this writer reviewed the importance of good stress management skills and placing focus on her own needs of self-reflection, she agreed to do so \par \par Time was also spent reviewing a current relationship, further reviewed the history of the relationship as well as her caregiving nature. Patient spends time reviewing the cycles of her relationship that have very similar themes, this writer encouraged her to reevaluate during her trip and encouraged her to remain focused on her own self-care rather than caring for/managing others ‘ needs, she agreed to do so \par \par No other needs or concerns noted at this time, will meet with patient again on the 11th at 5:00 o'clock  [Admit to Program     (Add Program Admission information to a new column in the Admit/Discharge Flowsheet)] : Admit to program [Every ___ week(s)] : Psychotherapy: Every [unfilled] week(s) [FreeTextEntry4] : see above

## 2023-04-11 ENCOUNTER — APPOINTMENT (OUTPATIENT)
Dept: PSYCHIATRY | Facility: CLINIC | Age: 33
End: 2023-04-11
Payer: COMMERCIAL

## 2023-04-11 PROCEDURE — 90837 PSYTX W PT 60 MINUTES: CPT | Mod: 95

## 2023-04-11 NOTE — PLAN
[FreeTextEntry2] : Patient is a 32 year old female who presents with symptoms of increased depression and anxiety related to psychosocial stressors. Patient has struggled with symptoms of anxiety for many years increased stress regarding relationships and issues that have now become a factor at work\par \par patient will continue weekly face to face individual psychotherapy session to address symptoms of anxiety and depression associated with the variety of psychosocial stressors including work relationships her family\par \par Problem : anxiety and depression\par Goal: decreased symptoms, with mindfulness and use of coping skills \par \par Problem: interpersonal relationship struggles\par Goal: will use coping skills and mindfulness to become more aware of relationship dynamics \par \par Problem: CG fatigue\par Goal: decrease fatigue levels by utilizing self care techniques and coping skills [Cognitive and/or Behavior Therapy] : Cognitive and/or Behavior Therapy  [Psychoeducation] : Psychoeducation  [Skills training (all types)] : Skills training (all types)  [Supportive Therapy] : Supportive Therapy [de-identified] :  The above named patient presents on time for her weekly session, she's alert and oriented, engages well, calm and cooperative, seems to benefit from support and time for ventilation \par \par Patient reviews upcoming vacation, reports some appropriate anxiety regarding flying and spends time reviewing work week \par \par Time was spent reviewing relationship she's involved in, reviewed the importance of setting boundaries when away in order to focus on her own self-care \par \par Time was spent reviewing the history of said relationship, challenged patient to explore her own needs and any changes that may be beneficial \par \par No other needs or concerns noted at this time, have arranged to meet with patient again virtually at 3:00 o'clock on the 27th  [FreeTextEntry1] : weekly CBT, skill building and supportive counseling, review of need for trauma focused treatment [Admit to Program     (Add Program Admission information to a new column in the Admit/Discharge Flowsheet)] : Admit to program [Every ___ week(s)] : Psychotherapy: Every [unfilled] week(s) [FreeTextEntry4] : see above

## 2023-04-11 NOTE — REASON FOR VISIT
[Patient preference] : as per patient preference [Telehealth (audio & video) - Individual/Group] : This visit was provided via telehealth using real-time 2-way audio visual technology. [Other Location: e.g. Home (Enter Location, City,State)___] : The provider was located at [unfilled]. [Home] : The patient, [unfilled], was located at home, [unfilled], at the time of the visit. [Verbal consent obtained from patient/other participant(s)] : Verbal consent for telehealth/telephonic services obtained from patient/other participant(s) [FreeTextEntry4] : 4:55 [FreeTextEntry5] : english [Other:___] : [unfilled] [[facility] Provider/Facility] : [facility] Provider/Facility [Patient] : Patient [FreeTextEntry2] : increased stress and symptoms [FreeTextEntry1] : see above

## 2023-04-27 ENCOUNTER — APPOINTMENT (OUTPATIENT)
Dept: PSYCHIATRY | Facility: CLINIC | Age: 33
End: 2023-04-27
Payer: COMMERCIAL

## 2023-04-27 PROCEDURE — 90837 PSYTX W PT 60 MINUTES: CPT | Mod: 95

## 2023-04-27 NOTE — REASON FOR VISIT
[Patient preference] : as per patient preference [Telehealth (audio & video) - Individual/Group] : This visit was provided via telehealth using real-time 2-way audio visual technology. [Technical or other issues] : Patient unable to effectively utilize tele-video due to technical or other issues. [Other Location: e.g. Home (Enter Location, City,State)___] : The provider was located at [unfilled]. [Home] : The patient, [unfilled], was located at home, [unfilled], at the time of the visit. [Verbal consent obtained from patient/other participant(s)] : Verbal consent for telehealth/telephonic services obtained from patient/other participant(s) [FreeTextEntry4] : 3:00 [FreeTextEntry5] : english [Other:___] : [unfilled] [[facility] Provider/Facility] : [facility] Provider/Facility [Patient] : Patient [FreeTextEntry2] : increased stress and symptoms [FreeTextEntry1] : see above

## 2023-04-27 NOTE — PLAN
[FreeTextEntry2] : Patient is a 32 year old female who presents with symptoms of increased depression and anxiety related to psychosocial stressors. Patient has struggled with symptoms of anxiety for many years increased stress regarding relationships and issues that have now become a factor at work\par \par patient will continue weekly face to face individual psychotherapy session to address symptoms of anxiety and depression associated with the variety of psychosocial stressors including work relationships her family\par \par Problem : anxiety and depression\par Goal: decreased symptoms, with mindfulness and use of coping skills \par \par Problem: interpersonal relationship struggles\par Goal: will use coping skills and mindfulness to become more aware of relationship dynamics \par \par Problem: CG fatigue\par Goal: decrease fatigue levels by utilizing self care techniques and coping skills [Cognitive and/or Behavior Therapy] : Cognitive and/or Behavior Therapy  [Psychoeducation] : Psychoeducation  [Skills training (all types)] : Skills training (all types)  [Supportive Therapy] : Supportive Therapy [de-identified] : Patient presents for her weekly session. She's alert and oriented. Engages well. Spends time reviewing her recent trip which she was able to enjoy as she set boundaries and focus on her own needs and time with family. \par \par Patient spends time reviewing interpersonal relationship and how it affects her overall emotional health. Encouraged her to set boundaries as well as goals within this relationship, with a focus on her own needs and goals \par \par  \par \par Patient has started classes as well as engagement in becoming a [employer] volunteer. No other needs or concerns noted at this time. Have arranged me with patient again  on the 5th at 12:00 o'clock.   [FreeTextEntry1] : weekly CBT, skill building and supportive counseling, review of need for trauma focused treatment [Admit to Program     (Add Program Admission information to a new column in the Admit/Discharge Flowsheet)] : Admit to program [Every ___ week(s)] : Psychotherapy: Every [unfilled] week(s) [FreeTextEntry4] : see above

## 2023-05-05 ENCOUNTER — NON-APPOINTMENT (OUTPATIENT)
Age: 33
End: 2023-05-05

## 2023-05-05 ENCOUNTER — APPOINTMENT (OUTPATIENT)
Dept: PSYCHIATRY | Facility: CLINIC | Age: 33
End: 2023-05-05

## 2023-05-05 NOTE — DISCUSSION/SUMMARY
[FreeTextEntry1] : patient showed @ 12:30 for 12:00 appt, apologizes for misunderstanding appt time, therefore will reschedule at patient's convenience, offered her 3 time slots for the week of 5/8. No needs or concerns noted at this time

## 2023-05-18 ENCOUNTER — NON-APPOINTMENT (OUTPATIENT)
Age: 33
End: 2023-05-18

## 2023-06-14 ENCOUNTER — NON-APPOINTMENT (OUTPATIENT)
Age: 33
End: 2023-06-14

## 2023-06-14 NOTE — DISCUSSION/SUMMARY
[FreeTextEntry1] : outreached to patient to offer appt , once availability is confirmed will move forward with scheduling

## 2023-07-14 ENCOUNTER — NON-APPOINTMENT (OUTPATIENT)
Age: 33
End: 2023-07-14

## 2023-07-14 NOTE — DISCUSSION/SUMMARY
[FreeTextEntry1] : contact made with the above named patient. She has not recently been seen, has been busy with school and travel, reports all is well. has requested appt on 7/28, once she confirms time slot availability will make appt

## 2023-07-28 ENCOUNTER — APPOINTMENT (OUTPATIENT)
Dept: PSYCHIATRY | Facility: CLINIC | Age: 33
End: 2023-07-28
Payer: COMMERCIAL

## 2023-07-28 PROCEDURE — 90837 PSYTX W PT 60 MINUTES: CPT | Mod: 95

## 2023-07-28 NOTE — PLAN
[FreeTextEntry2] : Patient is a 32 year old female who presents with symptoms of increased depression and anxiety related to psychosocial stressors. Patient has struggled with symptoms of anxiety for many years increased stress regarding relationships and issues that have now become a factor at work\par \par patient will continue weekly face to face individual psychotherapy session to address symptoms of anxiety and depression associated with the variety of psychosocial stressors including work relationships her family\par \par Problem : anxiety and depression\par Goal: decreased symptoms, with mindfulness and use of coping skills \par \par Problem: interpersonal relationship struggles\par Goal: will use coping skills and mindfulness to become more aware of relationship dynamics \par \par Problem: CG fatigue\par Goal: decrease fatigue levels by utilizing self care techniques and coping skills [Cognitive and/or Behavior Therapy] : Cognitive and/or Behavior Therapy  [Psychoeducation] : Psychoeducation  [Skills training (all types)] : Skills training (all types)  [Supportive Therapy] : Supportive Therapy [FreeTextEntry1] : weekly CBT, skill building and supportive counseling, review of need for trauma focused treatment [de-identified] : Patient presents on time for her session. She's alert and oriented and in good spirits. Makes good eye contact. Seems to benefit from support and time for ventilation. She reports she recently moved, has  a new social outlet. This writer encouraged her exploring setting her own boundaries as this new  socialization could pose increased care needs. She's aware, and feels there exists healthy boundaries. She has a new shift at work, which she's looking forward to, school went well. She's able to set boundaries within work relationships as she's recognized the level of stress they pose and the absence of stress when not engaged in certain interpersonal relationships. She continues to discuss romantic relationship and the cycle of their history. She's been able to place expectations and standards for herself, noting the cycle that causes her increased tension. No other needs or concerns noted at this time. Encouraged patient to explore and remain focused on her own needs as she often provides for the emotional support of others. Next appointment scheduled for august 10th at 11:00 o'clock in person.  [Admit to Program     (Add Program Admission information to a new column in the Admit/Discharge Flowsheet)] : Admit to program [Every ___ week(s)] : Psychotherapy: Every [unfilled] week(s) [FreeTextEntry4] : see above

## 2023-07-28 NOTE — REASON FOR VISIT
[Patient preference] : as per patient preference [Telehealth (audio & video) - Individual/Group] : This visit was provided via telehealth using real-time 2-way audio visual technology. [Other Location: e.g. Home (Enter Location, City,State)___] : The provider was located at [unfilled]. [Home] : The patient, [unfilled], was located at home, [unfilled], at the time of the visit. [Verbal consent obtained from patient/other participant(s)] : Verbal consent for telehealth/telephonic services obtained from patient/other participant(s) [FreeTextEntry4] : 12 [FreeTextEntry5] : english [Other:___] : [unfilled] [[facility] Provider/Facility] : [facility] Provider/Facility [Patient] : Patient [FreeTextEntry2] : increased stress and symptoms [FreeTextEntry1] : see above

## 2023-08-09 ENCOUNTER — NON-APPOINTMENT (OUTPATIENT)
Age: 33
End: 2023-08-09

## 2023-08-10 ENCOUNTER — APPOINTMENT (OUTPATIENT)
Dept: PSYCHIATRY | Facility: CLINIC | Age: 33
End: 2023-08-10

## 2023-10-05 ENCOUNTER — APPOINTMENT (OUTPATIENT)
Dept: PSYCHIATRY | Facility: CLINIC | Age: 33
End: 2023-10-05
Payer: COMMERCIAL

## 2023-10-05 DIAGNOSIS — F39 UNSPECIFIED MOOD [AFFECTIVE] DISORDER: ICD-10-CM

## 2023-10-05 PROCEDURE — 90837 PSYTX W PT 60 MINUTES: CPT | Mod: GT

## 2023-10-12 ENCOUNTER — APPOINTMENT (OUTPATIENT)
Dept: PSYCHIATRY | Facility: CLINIC | Age: 33
End: 2023-10-12

## 2023-12-07 ENCOUNTER — APPOINTMENT (OUTPATIENT)
Dept: PSYCHIATRY | Facility: CLINIC | Age: 33
End: 2023-12-07
Payer: COMMERCIAL

## 2023-12-07 DIAGNOSIS — F41.1 GENERALIZED ANXIETY DISORDER: ICD-10-CM

## 2023-12-07 DIAGNOSIS — Z63.0 PROBLEMS IN RELATIONSHIP WITH SPOUSE OR PARTNER: ICD-10-CM

## 2023-12-07 PROCEDURE — 90837 PSYTX W PT 60 MINUTES: CPT | Mod: GT

## 2023-12-07 SDOH — SOCIAL STABILITY - SOCIAL INSECURITY: PROBLEMS IN RELATIONSHIP WITH SPOUSE OR PARTNER: Z63.0

## 2023-12-22 ENCOUNTER — APPOINTMENT (OUTPATIENT)
Dept: PSYCHIATRY | Facility: CLINIC | Age: 33
End: 2023-12-22

## 2023-12-26 ENCOUNTER — NON-APPOINTMENT (OUTPATIENT)
Age: 33
End: 2023-12-26

## 2024-01-22 ENCOUNTER — NON-APPOINTMENT (OUTPATIENT)
Age: 34
End: 2024-01-22

## 2024-01-23 ENCOUNTER — APPOINTMENT (OUTPATIENT)
Dept: FAMILY MEDICINE | Facility: CLINIC | Age: 34
End: 2024-01-23
Payer: COMMERCIAL

## 2024-01-23 ENCOUNTER — NON-APPOINTMENT (OUTPATIENT)
Age: 34
End: 2024-01-23

## 2024-01-23 VITALS
SYSTOLIC BLOOD PRESSURE: 98 MMHG | RESPIRATION RATE: 14 BRPM | DIASTOLIC BLOOD PRESSURE: 68 MMHG | WEIGHT: 135.6 LBS | OXYGEN SATURATION: 99 % | HEART RATE: 71 BPM | TEMPERATURE: 97.6 F | BODY MASS INDEX: 24.8 KG/M2

## 2024-01-23 DIAGNOSIS — Z00.00 ENCOUNTER FOR GENERAL ADULT MEDICAL EXAMINATION W/OUT ABNORMAL FINDINGS: ICD-10-CM

## 2024-01-23 DIAGNOSIS — Z11.3 ENCOUNTER FOR SCREENING FOR INFECTIONS WITH A PREDOMINANTLY SEXUAL MODE OF TRANSMISSION: ICD-10-CM

## 2024-01-23 DIAGNOSIS — Z82.0 FAMILY HISTORY OF EPILEPSY AND OTHER DISEASES OF THE NERVOUS SYSTEM: ICD-10-CM

## 2024-01-23 PROCEDURE — 99395 PREV VISIT EST AGE 18-39: CPT

## 2024-01-23 PROCEDURE — 36415 COLL VENOUS BLD VENIPUNCTURE: CPT

## 2024-01-23 NOTE — HEALTH RISK ASSESSMENT
[No] : In the past 12 months have you used drugs other than those required for medical reasons? 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

## 2024-01-23 NOTE — HISTORY OF PRESENT ILLNESS
[FreeTextEntry1] : Pt is here for establish care and CPE. [de-identified] : 32 y/o female with pmhx of pituitary microadenoma, gerd presents for cpe. Patient is overall doing well. She is not on any medicaitons. She has a history of a pituitary microadenoma, last mri a few years ago. Has history of elevated prolactin levers in the past.  Patient does admit to recent headaches For the past 3 weeks.  States that they are almost daily.  States they are in the bilateral area of the temples., Denies nausea vomiting.  States she has never had headaches in the past.  Denies any other neurologic symptoms.

## 2024-01-23 NOTE — ASSESSMENT
[FreeTextEntry1] : CPE: -will order CBC w/ diff, CMP, Lipid, TSH and HgbA1c, labs to be drawn in office -Breast cancer screening: up to date, had mammogram this year -Cervical Cancer Screening: up to date, follows with gyn Dr. Moscoso -Colon ca screening, last colonoscopy in 2018 as per patient - will order std screening -Vaccinations:Tdap - up to date, 2016 -patient expressed understanding of plan, all questions answered  Hx of pituitary microademona will send for repeat MR pituitary w/w/o contrast will check prolactin level  Headaches may be tension headaches - will check MR pituitary as above recommend tylenol or nsaids as needed

## 2024-01-25 LAB
ALBUMIN SERPL ELPH-MCNC: 4.3 G/DL
ALP BLD-CCNC: 43 U/L
ALT SERPL-CCNC: 15 U/L
ANION GAP SERPL CALC-SCNC: 9 MMOL/L
AST SERPL-CCNC: 15 U/L
BASOPHILS # BLD AUTO: 0.06 K/UL
BASOPHILS NFR BLD AUTO: 0.8 %
BILIRUB SERPL-MCNC: 0.7 MG/DL
BUN SERPL-MCNC: 10 MG/DL
C TRACH RRNA SPEC QL NAA+PROBE: NOT DETECTED
CALCIUM SERPL-MCNC: 9.3 MG/DL
CHLORIDE SERPL-SCNC: 103 MMOL/L
CHOLEST SERPL-MCNC: 181 MG/DL
CO2 SERPL-SCNC: 25 MMOL/L
CREAT SERPL-MCNC: 0.71 MG/DL
EGFR: 114 ML/MIN/1.73M2
EOSINOPHIL # BLD AUTO: 0.13 K/UL
EOSINOPHIL NFR BLD AUTO: 1.8 %
ESTIMATED AVERAGE GLUCOSE: 91 MG/DL
GLUCOSE SERPL-MCNC: 82 MG/DL
HBA1C MFR BLD HPLC: 4.8 %
HBV CORE IGG+IGM SER QL: NONREACTIVE
HCT VFR BLD CALC: 43 %
HCV AB SER QL: NONREACTIVE
HCV S/CO RATIO: 0.09 S/CO
HDLC SERPL-MCNC: 82 MG/DL
HGB BLD-MCNC: 13.7 G/DL
HIV1+2 AB SPEC QL IA.RAPID: NONREACTIVE
IMM GRANULOCYTES NFR BLD AUTO: 0.3 %
LDLC SERPL CALC-MCNC: 90 MG/DL
LYMPHOCYTES # BLD AUTO: 1.75 K/UL
LYMPHOCYTES NFR BLD AUTO: 23.8 %
MAN DIFF?: NORMAL
MCHC RBC-ENTMCNC: 28.8 PG
MCHC RBC-ENTMCNC: 31.9 GM/DL
MCV RBC AUTO: 90.5 FL
MONOCYTES # BLD AUTO: 0.53 K/UL
MONOCYTES NFR BLD AUTO: 7.2 %
N GONORRHOEA RRNA SPEC QL NAA+PROBE: NOT DETECTED
NEUTROPHILS # BLD AUTO: 4.87 K/UL
NEUTROPHILS NFR BLD AUTO: 66.1 %
NONHDLC SERPL-MCNC: 99 MG/DL
PLATELET # BLD AUTO: 292 K/UL
POTASSIUM SERPL-SCNC: 4.6 MMOL/L
PROT SERPL-MCNC: 6.7 G/DL
RBC # BLD: 4.75 M/UL
RBC # FLD: 13 %
SODIUM SERPL-SCNC: 137 MMOL/L
SOURCE AMPLIFICATION: NORMAL
T PALLIDUM AB SER QL IA: NEGATIVE
TRIGL SERPL-MCNC: 42 MG/DL
TSH SERPL-ACNC: 1.29 UIU/ML
WBC # FLD AUTO: 7.36 K/UL

## 2024-02-06 ENCOUNTER — NON-APPOINTMENT (OUTPATIENT)
Age: 34
End: 2024-02-06

## 2024-02-06 NOTE — PLAN
[FreeTextEntry2] : Patient is a 32 year old female who presents with symptoms of increased depression and anxiety related to psychosocial stressors. Patient has struggled with symptoms of anxiety for many years increased stress regarding relationships and issues that have now become a factor at work\par  \par  patient will continue weekly face to face individual psychotherapy session to address symptoms of anxiety and depression associated with the variety of psychosocial stressors including work relationships her family\par  \par  Problem : anxiety and depression\par  Goal: decreased symptoms, with mindfulness and use of coping skills \par  \par  Problem: interpersonal relationship struggles\par  Goal: will use coping skills and mindfulness to become more aware of relationship dynamics \par  \par  Problem: CG fatigue\par  Goal: decrease fatigue levels by utilizing self care techniques and coping skills [FreeTextEntry1] : will discharge on this date , 2/6/24, no other care required at this time unless indicated by patient.

## 2024-02-06 NOTE — DISCUSSION/SUMMARY
[FreeTextEntry1] : patient stable during the course of treatment, experiencing stress related to work and relationships. She was easy to engage, thoughtful in her exploration of life stressors and always insightful into her own behavior and conflict resolution.  Due to demanding work schedule patient was not able t commit to regularly schedule appts which would optimize the therapeutic experience, therefore it seems appropriate to discharge at this time, patient aware that she may return should there be any change.

## 2024-02-06 NOTE — REASON FOR VISIT
[FreeTextEntry5] : english [FreeTextEntry9] : 3/2/2023 [FreeTextEntry8] : 2/6/2024 [Other:___] : [unfilled] [[facility] Provider/Facility] : [facility] Provider/Facility [Patient] : Patient [FreeTextEntry2] : increased stress and symptoms [FreeTextEntry1] : see above

## 2024-02-06 NOTE — TREATMENT
[de-identified] : CBT. skill building, supportive talk therapy related to conflict resolution. [de-identified] : change of status in relationships [de-identified] : during treatment was stable, working towards goals [de-identified] : see above

## 2024-02-20 ENCOUNTER — APPOINTMENT (OUTPATIENT)
Dept: MRI IMAGING | Facility: CLINIC | Age: 34
End: 2024-02-20
Payer: COMMERCIAL

## 2024-02-20 ENCOUNTER — APPOINTMENT (OUTPATIENT)
Dept: MAMMOGRAPHY | Facility: CLINIC | Age: 34
End: 2024-02-20
Payer: COMMERCIAL

## 2024-02-20 ENCOUNTER — APPOINTMENT (OUTPATIENT)
Dept: ULTRASOUND IMAGING | Facility: CLINIC | Age: 34
End: 2024-02-20

## 2024-02-20 PROCEDURE — 77063 BREAST TOMOSYNTHESIS BI: CPT

## 2024-02-20 PROCEDURE — A9585: CPT

## 2024-02-20 PROCEDURE — 77067 SCR MAMMO BI INCL CAD: CPT

## 2024-02-20 PROCEDURE — 70553 MRI BRAIN STEM W/O & W/DYE: CPT

## 2024-02-22 DIAGNOSIS — E22.1 HYPERPROLACTINEMIA: ICD-10-CM

## 2024-02-22 DIAGNOSIS — D35.2 BENIGN NEOPLASM OF PITUITARY GLAND: ICD-10-CM

## 2024-03-01 ENCOUNTER — APPOINTMENT (OUTPATIENT)
Dept: NEUROLOGY | Facility: CLINIC | Age: 34
End: 2024-03-01

## 2024-08-26 ENCOUNTER — APPOINTMENT (OUTPATIENT)
Dept: INTERNAL MEDICINE | Facility: CLINIC | Age: 34
End: 2024-08-26
Payer: COMMERCIAL

## 2024-08-26 VITALS
DIASTOLIC BLOOD PRESSURE: 73 MMHG | HEIGHT: 62 IN | BODY MASS INDEX: 24.29 KG/M2 | OXYGEN SATURATION: 99 % | WEIGHT: 132 LBS | SYSTOLIC BLOOD PRESSURE: 91 MMHG | HEART RATE: 89 BPM

## 2024-08-26 DIAGNOSIS — Z11.1 ENCOUNTER FOR SCREENING FOR RESPIRATORY TUBERCULOSIS: ICD-10-CM

## 2024-08-26 DIAGNOSIS — Z01.84 ENCOUNTER FOR ANTIBODY RESPONSE EXAMINATION: ICD-10-CM

## 2024-08-26 DIAGNOSIS — R10.11 RIGHT UPPER QUADRANT PAIN: ICD-10-CM

## 2024-08-26 DIAGNOSIS — E22.1 HYPERPROLACTINEMIA: ICD-10-CM

## 2024-08-26 PROCEDURE — 99214 OFFICE O/P EST MOD 30 MIN: CPT

## 2024-08-26 PROCEDURE — 36415 COLL VENOUS BLD VENIPUNCTURE: CPT

## 2024-08-26 NOTE — PLAN
[FreeTextEntry1] : Total time on this date and for this encounter was 31 minutes which included: reviewing medical record in preparation to see the patient, performing a medically appropriate examination and/or evaluation, counseling and educating the patient, ordering medications, tests, or procedures, referring to and communicating with other health care professionals about management, and documenting clinical information in the electronic health record. >50% of time spent face to face counseling patient

## 2024-08-26 NOTE — ASSESSMENT
[FreeTextEntry1] : Pituitary adenoma stable mr 2/2024 will check lab work today including prolactin, cortisol, lh, fsh, testosterone, growth hormone, and igf-1  Form for NP school will check titers for MMR and varicella will screen for TB with quantiferon gold will complete form pending lab results  Intermittent RUQ pain will send for US

## 2024-08-26 NOTE — HISTORY OF PRESENT ILLNESS
[de-identified] : 35 y/o female with pmhx of pituitary microadenoma, gerd presents for follow up. Patient is overall doing well. She has a history of a pituitary microadenoma, last mri a few years ago. Has history of elevated prolactin levers in the past. Denies any neurologic symptoms  Patient in need of a form completed for school, which requires titers and TB testing,  Patient also with intermittent RUQ abdominal pain, tender to palpation at times. Denies any association with food. Denies nausea, vomiting, diarrhea, constipation.

## 2024-08-27 LAB
FSH SERPL-MCNC: 3.1 IU/L
GH SERPL-MCNC: 5.15 NG/ML
LH SERPL-ACNC: 5.4 IU/L
PROLACTIN SERPL-MCNC: 20.5 NG/ML
TESTOST SERPL-MCNC: 23.7 NG/DL

## 2024-08-28 LAB
MEV IGG FLD QL IA: >300 AU/ML
MEV IGG+IGM SER-IMP: POSITIVE
MUV AB SER-ACNC: POSITIVE
MUV IGG SER QL IA: 185 AU/ML
RUBV IGG FLD-ACNC: 9.25 INDEX
RUBV IGG SER-IMP: POSITIVE
VZV AB TITR SER: POSITIVE
VZV IGG SER IF-ACNC: 515.8 INDEX

## 2024-08-30 DIAGNOSIS — R79.89 OTHER SPECIFIED ABNORMAL FINDINGS OF BLOOD CHEMISTRY: ICD-10-CM

## 2024-08-30 DIAGNOSIS — D35.2 BENIGN NEOPLASM OF PITUITARY GLAND: ICD-10-CM

## 2024-08-30 LAB
IGF BP1 SERPL-MCNC: 293 NG/ML
M TB IFN-G BLD-IMP: NEGATIVE
QUANTIFERON TB PLUS MITOGEN MINUS NIL: 8.29 IU/ML
QUANTIFERON TB PLUS NIL: 0.03 IU/ML
QUANTIFERON TB PLUS TB1 MINUS NIL: 0.01 IU/ML
QUANTIFERON TB PLUS TB2 MINUS NIL: 0 IU/ML

## 2024-09-07 LAB
CORTICOSTEROID BIND GLOBULIN: 3.1 MG/DL
CORTIS SERPL-MCNC: 18 UG/DL
CORTISOL, FREE: 1.3 UG/DL
PFCX: 7.2 %

## 2024-09-19 ENCOUNTER — APPOINTMENT (OUTPATIENT)
Dept: NEUROSURGERY | Facility: CLINIC | Age: 34
End: 2024-09-19
Payer: COMMERCIAL

## 2024-09-19 VITALS
WEIGHT: 132 LBS | BODY MASS INDEX: 24.29 KG/M2 | SYSTOLIC BLOOD PRESSURE: 114 MMHG | OXYGEN SATURATION: 96 % | DIASTOLIC BLOOD PRESSURE: 78 MMHG | TEMPERATURE: 98.9 F | HEART RATE: 85 BPM | HEIGHT: 62 IN

## 2024-09-19 PROCEDURE — 99204 OFFICE O/P NEW MOD 45 MIN: CPT

## 2024-09-20 NOTE — DATA REVIEWED
[de-identified] : BURAK QUIÑONES   PROCEDURE DATE: 02/20/2024    INTERPRETATION: Clinical indications: History of pituitary microadenoma  MRI of the sella turcica was performed using coronal and sagittal T1-weighted sequence. Coronal T2-weighted sequence was performed as well. Axial T2 FLAIR and diffusion-weighted sequence of the brain. The patient was injected with approximately 6.5 cc gadavist IV with 1 cc of contrast discarded. Coronal sagittal T1 was performed through the sella turcica. Dynamic coronal T1 was performed as well. Axial coronal sagittal T1 weighted sequence was performed through the brain.  A normal posterior pituitary bright spot is seen  The pituitary gland measures approximately 9.7 mm in height which is the upper limits of normal  On the early dynamic imaging there are two areas of decreased enhancement seen involving the pituitary gland. This is seen both on the left and right side. These lesions are best seen on series 8 image 35. The lesion on the right side measures approximately 4.2 x 5.7 mm and the lesion on the left side measures approximately 3.0 x 4.1 mm. These findings could be compatible with pituitary microadenomas though other possibilities include Rathke's cleft cyst or similar etiologies  Pituitary stalk is midline and appears normal  Evaluation of the surrounding sella structures including the optic chiasm and internal carotid arteries appears normal  Evaluation of the brain parenchyma demonstrate no acute hemorrhage mass mass effect or abnormal enhancement.  Evaluation of the diffusion weighted sequence demonstrates no abnormal areas of restricted diffusion to suggest acute infarct.  IMPRESSION: Pituitary lesion as described above.

## 2024-09-20 NOTE — HISTORY OF PRESENT ILLNESS
[> 3 months] : more  than 3 months [FreeTextEntry1] : pituitary microadenoma [de-identified] : Ms. Melendez presents for initial neurosurgical evaluation at this office.  She has a history of microadenoma previously followed by Dr. Mathews, who also evaluated and treated the patient for headaches.  She was previously seen by a neurosurgeon with whom Dr. Mathews was associated.  She had transient galactorrhea but endorses a normalized prolactin levels without medication.  She states that she is pending endocrine and ophthal follow-up.

## 2024-09-20 NOTE — HISTORY OF PRESENT ILLNESS
[> 3 months] : more  than 3 months [FreeTextEntry1] : pituitary microadenoma [de-identified] : Ms. Melendez presents for initial neurosurgical evaluation at this office.  She has a history of microadenoma previously followed by Dr. Mathews, who also evaluated and treated the patient for headaches.  She was previously seen by a neurosurgeon with whom Dr. Mathews was associated.  She had transient galactorrhea but endorses a normalized prolactin levels without medication.  She states that she is pending endocrine and ophthal follow-up.

## 2024-09-20 NOTE — DATA REVIEWED
[de-identified] : BURAK QUIÑONES   PROCEDURE DATE: 02/20/2024    INTERPRETATION: Clinical indications: History of pituitary microadenoma  MRI of the sella turcica was performed using coronal and sagittal T1-weighted sequence. Coronal T2-weighted sequence was performed as well. Axial T2 FLAIR and diffusion-weighted sequence of the brain. The patient was injected with approximately 6.5 cc gadavist IV with 1 cc of contrast discarded. Coronal sagittal T1 was performed through the sella turcica. Dynamic coronal T1 was performed as well. Axial coronal sagittal T1 weighted sequence was performed through the brain.  A normal posterior pituitary bright spot is seen  The pituitary gland measures approximately 9.7 mm in height which is the upper limits of normal  On the early dynamic imaging there are two areas of decreased enhancement seen involving the pituitary gland. This is seen both on the left and right side. These lesions are best seen on series 8 image 35. The lesion on the right side measures approximately 4.2 x 5.7 mm and the lesion on the left side measures approximately 3.0 x 4.1 mm. These findings could be compatible with pituitary microadenomas though other possibilities include Rathke's cleft cyst or similar etiologies  Pituitary stalk is midline and appears normal  Evaluation of the surrounding sella structures including the optic chiasm and internal carotid arteries appears normal  Evaluation of the brain parenchyma demonstrate no acute hemorrhage mass mass effect or abnormal enhancement.  Evaluation of the diffusion weighted sequence demonstrates no abnormal areas of restricted diffusion to suggest acute infarct.  IMPRESSION: Pituitary lesion as described above.

## 2024-09-20 NOTE — ASSESSMENT
[FreeTextEntry1] : Ms. Melendez presents for initial neurosurgical evaluation with history and imaging as above.  repeat MRI brain w and wo contrast endo eval ophthal eval f/u after testing  The patient knows to call the office with any new or concerning symptoms in the interim.

## 2024-09-20 NOTE — PHYSICAL EXAM
[Oriented To Time, Place, And Person] : oriented to person, place, and time [Person] : oriented to person [Place] : oriented to place [Time] : oriented to time [Short Term Intact] : short term memory intact [Concentration Intact] : normal concentrating ability [Fluency] : fluency intact [Cranial Nerves Optic (II)] : visual acuity intact bilaterally,  pupils equal round and reactive to light [Cranial Nerves Oculomotor (III)] : extraocular motion intact [Cranial Nerves Facial (VII)] : face symmetrical [Cranial Nerves Hypoglossal (XII)] : there was no tongue deviation with protrusion [Motor Tone] : muscle tone was normal in all four extremities [Motor Strength] : muscle strength was normal in all four extremities [Sensation Tactile Decrease] : light touch was intact [Abnormal Walk] : normal gait

## 2024-09-26 ENCOUNTER — APPOINTMENT (OUTPATIENT)
Dept: ORTHOPEDIC SURGERY | Facility: CLINIC | Age: 34
End: 2024-09-26
Payer: COMMERCIAL

## 2024-09-26 VITALS — BODY MASS INDEX: 24.29 KG/M2 | HEIGHT: 62 IN | WEIGHT: 132 LBS

## 2024-09-26 DIAGNOSIS — M76.62 ACHILLES TENDINITIS, LEFT LEG: ICD-10-CM

## 2024-09-26 DIAGNOSIS — M25.572 PAIN IN LEFT ANKLE AND JOINTS OF LEFT FOOT: ICD-10-CM

## 2024-09-26 DIAGNOSIS — G89.29 PAIN IN LEFT ANKLE AND JOINTS OF LEFT FOOT: ICD-10-CM

## 2024-09-26 PROCEDURE — 99203 OFFICE O/P NEW LOW 30 MIN: CPT

## 2024-09-26 PROCEDURE — 73610 X-RAY EXAM OF ANKLE: CPT | Mod: LT

## 2024-09-26 NOTE — PHYSICAL EXAM
[de-identified] : General: No acute distress Mental: Alert and oriented x3 Eyes: Conjunctivitis not seen Chest: Symmetric chest rise, no audible wheezing Skin: Bilateral lower extremities absent from rashes and ulcers Abdomen: No distention  Left ankle: Skin: Clean, dry, intact  Inspection: Mild flatfoot.  No obvious malalignment, no swelling, no effusion  Pulses: 2+ DP/PT pulses  ROM: 10 degrees of dorsiflexion, 20 degrees of plantarflexion Tenderness: Mild tenderness at the Achilles tendon, tenderness at the posterior tibial tendon.  No tenderness over the lateral malleolus, no CFL/ATFL/PTFL pain. No medial malleolus pain, no deltoid ligament pain. No proximal fibular pain. No heel pain.  Stability: Negative anterior/posterior drawer.   Strength: 5/5 TA/GS/EHL Neuro: Intact to light touch throughout  Additional tests: Negative Mortons test, Negative syndesmosis squeeze test [de-identified] : X-rays of the left ankle with 3 views shows appropriate alignment, well-maintained joint spaces, no fracture.

## 2024-09-26 NOTE — DISCUSSION/SUMMARY
[de-identified] : 34-year-old female with subacute left ankle pain.  Symptoms are secondary to Achilles tendinitis and posterior tibial tendinitis.  She also has a mild flatfoot deformity with ankle valgus.  I recommended stretching and strengthening exercises, arch support insert, modified shoe wear, Tylenol or NSAIDs as needed.  Follow-up as needed.

## 2024-09-26 NOTE — HISTORY OF PRESENT ILLNESS
[de-identified] : 34-year-old female with 3 weeks of atraumatic left ankle pain.  Reports posterior and medial pain, no swelling, no stiffness.  Denies ankle clicking or instability.  Denies having symptoms in the past.  She has pain with weightbearing and prolonged standing.  Improves with rest and compression.  She has not had other treatment.

## 2024-10-02 ENCOUNTER — APPOINTMENT (OUTPATIENT)
Dept: ENDOCRINOLOGY | Facility: CLINIC | Age: 34
End: 2024-10-02
Payer: COMMERCIAL

## 2024-10-02 VITALS
BODY MASS INDEX: 24.29 KG/M2 | HEIGHT: 62 IN | HEART RATE: 85 BPM | DIASTOLIC BLOOD PRESSURE: 72 MMHG | OXYGEN SATURATION: 100 % | WEIGHT: 132 LBS | TEMPERATURE: 97.7 F | SYSTOLIC BLOOD PRESSURE: 116 MMHG | RESPIRATION RATE: 14 BRPM

## 2024-10-02 DIAGNOSIS — D35.2 BENIGN NEOPLASM OF PITUITARY GLAND: ICD-10-CM

## 2024-10-02 DIAGNOSIS — Z87.59 PERSONAL HISTORY OF OTHER COMPLICATIONS OF PREGNANCY, CHILDBIRTH AND THE PUERPERIUM: ICD-10-CM

## 2024-10-02 DIAGNOSIS — R79.89 OTHER SPECIFIED ABNORMAL FINDINGS OF BLOOD CHEMISTRY: ICD-10-CM

## 2024-10-02 DIAGNOSIS — Z86.39 PERSONAL HISTORY OF OTHER ENDOCRINE, NUTRITIONAL AND METABOLIC DISEASE: ICD-10-CM

## 2024-10-02 PROCEDURE — 99205 OFFICE O/P NEW HI 60 MIN: CPT

## 2024-10-02 RX ORDER — DEXAMETHASONE 1 MG/1
1 TABLET ORAL
Qty: 1 | Refills: 0 | Status: ACTIVE | COMMUNITY
Start: 2024-10-02 | End: 1900-01-01

## 2024-10-02 NOTE — PHYSICAL EXAM
[de-identified] : General: No distress, well nourished Eyes: Normal Sclera, EOMI, PERRL ENT: Normal appearance of the nose, normal oropharynx Neck/Thyroid: No cervical lymphadenopathy, thyroid gland 20 g in size, no thyroid nodules, non-tender Respiratory: No use of accessory muscles of respiration, vesicular breath sounds heard bilaterally, no crepitations or ronchi Cardiovascular: S1 and S2 heard and normal, no S3 or S4, no murmurs, radial pulse normal bilaterally Abdomen: soft, non-tender, no masses, normal bowel sounds Musculoskeletal: No swelling or deformities of joints of hands, no pedal edema Neurological: Normal range of motion in the hands, Normal brachioradialis reflexes bilaterally Psychiatry: Patient converses normally, good judgement and insight Skin: No rashes in hands, no nodules palpated in hands

## 2024-10-02 NOTE — HISTORY OF PRESENT ILLNESS
[FreeTextEntry1] : Problems: 1. Multiple pituitary microadenomas 2. History of galactorrhea 3. History of hyperprolactinemia 4. Elevated IGF protein - 1  Multiple pituitary microadenomas/ History of galactorrhea/History of hyperprolactinemia/Elevated IGF protein - 1 1. In 2018, patient had galactorrhea and was found to have hyperprolactinemia (however macroprolactin level was not done) and a right pituitary microadenoma. In 2018, her galactorrhea resolved spontaneously after 2 months. She followed up once in 2018 with an Endocrinologist but did not follow up with Endocrinology again until her initial visit with me on 10/02/2024. In 2022, her prolactin level was normal In August 2024, her IGF protein 1 was elevated but patient was not fasting when this lab was done 2. Labs: 11/08/2018 - Serum prolactin 56.6 (5 to 33), Cr N, TSH N 2022 - TSH N, Free T4 N, prolactin N 08/26/2024 - IGF protein 1 - 293 (79 to 259), serum prolactin 20.5 N, FSH N, LH N, cortisol 18  3. Radiology: 2018 - MRI pituitary gland - there is a right subcentimeter (6 mm) pituitary microadenoma Feb 2024 - MRI pituitary gland - there is a right subcentimeter (5.7 mm) pituitary microadenoma and a left subcentimeter pituitary microadenoma (4.1 mm) 4. Father had a pituitary adenoma (nonfunctional per patient) and this eventually resolved spontaneously 5. Neurosurgery: Patient followed up with Neurosurgery in Sept 2024 and repeat MRI was requested 6. Symptoms on 10/02/2024 - no increase in ring size or feet size, patient has regular menstrual cycles, patient no longer has galactorrhea, patient does not have children and is not planning to have children in the future 7. Meds: Patient was not on any medications or estrogen on 10/02/2024

## 2024-10-02 NOTE — ASSESSMENT
[FreeTextEntry1] : The patient was diagnosed with a right pituitary microadenoma in 2018 - she had hyperprolactinemia and galactorrhea then but macroprolactin level was not checked then. Her Galactorrhea resolved after 2 months in 2018. In 2022 and August 2024, her prolactin level was normal. In Feb 2024 the right pituitary microadenoma was stable but she had a new left pituitary microadenoma - NEUROSURGERY ORDERED REPEAT MRI PITUITARY GLAND TO BE DONE THIS MONTH.  IN August 2024 - IGF protein 1 was elevated but patient was not fasting so I ordered an IGF-1 level to be done a [facility]  I ordered labs to evaluate the hypothalamic pituitary axes   Evaluation of the hypothalamic pituitary axes: 1. Prolactin - 2018 - elevated but no macroprolactin level seen, 2022 and August 2024 - N 2. Thyroid - 2022 - TSH and Free T4 N 3. Adrenal - August 2024 - serum cortisol 18 so no adrenal insufficiency 4. Growth hormone - August 2024 - IGF protein 1 - elevated but patient was not fasting so I ordered a fasting IGF-1 level to be done a [facility] 5. Gonadal - August 2024 - LH N, FSH N 6. Posterior pituitary - Jan 2024 - Serum Na N   Plan: 1. 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 Day 2 - Dexamethasone suppression test - patient to use dexamethasone 1 mg po at 11 pm to 12 midnight and come to the lab the next day at 8 am to 9 am to have serum cortisol level and dexamethasone level checked.  2. MRI pituitary gland to be done per Neurosurgery 3. Follow up in 8 weeks to review lab results and MRI results - telehealth visit ok - patient says she wants to follow up in 8 weeks as she is busy this month

## 2024-11-24 ENCOUNTER — EMERGENCY (EMERGENCY)
Facility: HOSPITAL | Age: 34
LOS: 1 days | Discharge: HOME | End: 2024-11-24
Attending: EMERGENCY MEDICINE | Admitting: EMERGENCY MEDICINE
Payer: COMMERCIAL

## 2024-11-24 VITALS
DIASTOLIC BLOOD PRESSURE: 86 MMHG | SYSTOLIC BLOOD PRESSURE: 127 MMHG | HEART RATE: 78 BPM | HEIGHT: 62 IN | RESPIRATION RATE: 16 BRPM | TEMPERATURE: 98 F | WEIGHT: 130.07 LBS | OXYGEN SATURATION: 99 %

## 2024-11-24 LAB
ALBUMIN SERPL ELPH-MCNC: 3.3 G/DL — SIGNIFICANT CHANGE UP (ref 3.3–5)
ALT FLD-CCNC: 62 U/L — SIGNIFICANT CHANGE UP (ref 12–78)
ANION GAP SERPL CALC-SCNC: 5 MMOL/L — SIGNIFICANT CHANGE UP (ref 5–17)
AST SERPL-CCNC: 37 U/L — SIGNIFICANT CHANGE UP (ref 15–37)
BASOPHILS # BLD AUTO: 0.02 K/UL — SIGNIFICANT CHANGE UP (ref 0–0.2)
BASOPHILS NFR BLD AUTO: 0.3 % — SIGNIFICANT CHANGE UP (ref 0–2)
BUN SERPL-MCNC: 8 MG/DL — SIGNIFICANT CHANGE UP (ref 7–23)
CALCIUM SERPL-MCNC: 8.3 MG/DL — LOW (ref 8.5–10.1)
CHLORIDE SERPL-SCNC: 110 MMOL/L — HIGH (ref 96–108)
CO2 SERPL-SCNC: 26 MMOL/L — SIGNIFICANT CHANGE UP (ref 22–31)
CREAT SERPL-MCNC: 0.66 MG/DL — SIGNIFICANT CHANGE UP (ref 0.5–1.3)
EGFR: 117 ML/MIN/1.73M2 — SIGNIFICANT CHANGE UP
EOSINOPHIL # BLD AUTO: 0.09 K/UL — SIGNIFICANT CHANGE UP (ref 0–0.5)
EOSINOPHIL NFR BLD AUTO: 1.2 % — SIGNIFICANT CHANGE UP (ref 0–6)
GLUCOSE SERPL-MCNC: 84 MG/DL — SIGNIFICANT CHANGE UP (ref 70–99)
HCT VFR BLD CALC: 40 % — SIGNIFICANT CHANGE UP (ref 34.5–45)
HGB BLD-MCNC: 13.5 G/DL — SIGNIFICANT CHANGE UP (ref 11.5–15.5)
IMM GRANULOCYTES NFR BLD AUTO: 0.3 % — SIGNIFICANT CHANGE UP (ref 0–0.9)
LIDOCAIN IGE QN: 47 U/L — SIGNIFICANT CHANGE UP (ref 13–75)
LYMPHOCYTES # BLD AUTO: 1.95 K/UL — SIGNIFICANT CHANGE UP (ref 1–3.3)
LYMPHOCYTES # BLD AUTO: 25.2 % — SIGNIFICANT CHANGE UP (ref 13–44)
MCHC RBC-ENTMCNC: 29.4 PG — SIGNIFICANT CHANGE UP (ref 27–34)
MCHC RBC-ENTMCNC: 33.8 G/DL — SIGNIFICANT CHANGE UP (ref 32–36)
MCV RBC AUTO: 87.1 FL — SIGNIFICANT CHANGE UP (ref 80–100)
MONOCYTES # BLD AUTO: 0.72 K/UL — SIGNIFICANT CHANGE UP (ref 0–0.9)
MONOCYTES NFR BLD AUTO: 9.3 % — SIGNIFICANT CHANGE UP (ref 2–14)
NEUTROPHILS # BLD AUTO: 4.94 K/UL — SIGNIFICANT CHANGE UP (ref 1.8–7.4)
NEUTROPHILS NFR BLD AUTO: 63.7 % — SIGNIFICANT CHANGE UP (ref 43–77)
NRBC # BLD: 0 /100 WBCS — SIGNIFICANT CHANGE UP (ref 0–0)
PLATELET # BLD AUTO: 257 K/UL — SIGNIFICANT CHANGE UP (ref 150–400)
POTASSIUM SERPL-MCNC: 4 MMOL/L — SIGNIFICANT CHANGE UP (ref 3.5–5.3)
POTASSIUM SERPL-SCNC: 4 MMOL/L — SIGNIFICANT CHANGE UP (ref 3.5–5.3)
RBC # BLD: 4.59 M/UL — SIGNIFICANT CHANGE UP (ref 3.8–5.2)
RBC # FLD: 12.3 % — SIGNIFICANT CHANGE UP (ref 10.3–14.5)
SODIUM SERPL-SCNC: 141 MMOL/L — SIGNIFICANT CHANGE UP (ref 135–145)
WBC # BLD: 7.74 K/UL — SIGNIFICANT CHANGE UP (ref 3.8–10.5)
WBC # FLD AUTO: 7.74 K/UL — SIGNIFICANT CHANGE UP (ref 3.8–10.5)

## 2024-11-24 PROCEDURE — 99285 EMERGENCY DEPT VISIT HI MDM: CPT

## 2024-11-24 PROCEDURE — 99053 MED SERV 10PM-8AM 24 HR FAC: CPT

## 2024-11-24 RX ORDER — ONDANSETRON HYDROCHLORIDE 2 MG/ML
4 INJECTION, SOLUTION INTRAMUSCULAR; INTRAVENOUS ONCE
Refills: 0 | Status: COMPLETED | OUTPATIENT
Start: 2024-11-24 | End: 2024-11-24

## 2024-11-24 RX ORDER — SODIUM CHLORIDE 9 MG/ML
1000 INJECTION, SOLUTION INTRAMUSCULAR; INTRAVENOUS; SUBCUTANEOUS
Refills: 0 | Status: COMPLETED | OUTPATIENT
Start: 2024-11-24 | End: 2024-11-25

## 2024-11-24 RX ADMIN — ONDANSETRON HYDROCHLORIDE 4 MILLIGRAM(S): 2 INJECTION, SOLUTION INTRAMUSCULAR; INTRAVENOUS at 23:43

## 2024-11-24 RX ADMIN — SODIUM CHLORIDE 1000 MILLILITER(S): 9 INJECTION, SOLUTION INTRAMUSCULAR; INTRAVENOUS; SUBCUTANEOUS at 23:43

## 2024-11-24 NOTE — ED PROVIDER NOTE - CARE PROVIDER_API CALL
Mitchell Palma  Gastroenterology  [address] [place] [id number]  Phone: [phone]  Fax: [phone]  Follow Up Time: 1-3 Days

## 2024-11-24 NOTE — ED ADULT NURSE NOTE - OBJECTIVE STATEMENT
pt presents to the ED c/o RUQ abdominal pain associated w/ nausea, no vomiting x 6 days. pt also c/o decreasing eating/drinking. pt states family hx of gallstones. no urinary symptoms. denies chest pain, sob, difficulty breathing, diarrhea, constipation, fever, chills. aox4, maex4. 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 iv lock 20 g placed to LAC, patent and flushing. no s/s redness, swelling or infiltration. labs collected and sent. pending results. NAD.

## 2024-11-24 NOTE — ED PROVIDER NOTE - CLINICAL SUMMARY MEDICAL DECISION MAKING FREE TEXT BOX
Acute right upper quadrant and left lower quadrant abdominal pain, with associated vomiting and diarrhea over the past week.  Will check labs, CT abdomen pelvis, right upper quadrant ultrasound, IV fluids, reeval

## 2024-11-24 NOTE — ED ADULT NURSE NOTE - NSFALLCONCLUSION_ED_ALL_ED
Detail Level: Zone Detail Level: Simple Detail Level: Generalized Detail Level: Detailed Universal Safety Interventions

## 2024-11-24 NOTE — ED PROVIDER NOTE - OBJECTIVE STATEMENT
34-year-old female with no significant past medical history presents with having some nausea, on no vomiting with diarrhea, decreased p.o. intake over the past 6 days.  No known fever.  The patient states that today she has had less vomiting, but has had persistent right upper quadrant pain over the past few days.  No dysuria/hematuria.  No cough/URI.  The patient with decreased p.o. 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  No aggravating or alleviating factors otherwise noted.  No known sick contacts.  No other acute complaints at this time.

## 2024-11-24 NOTE — ED PROVIDER NOTE - ENMT, MLM
Airway patent, Nasal mucosa clear. Mouth with normal mucosa. Throat has no vesicles, no oropharyngeal exudates and uvula is midline. neck supple. no meningeal signs.
no

## 2024-11-24 NOTE — ED ADULT TRIAGE NOTE - CHIEF COMPLAINT QUOTE
RUQ abd pain with nausea x 1 week.  pt states this is the first day without vomiting.  denies diarrhea

## 2024-11-24 NOTE — ED PROVIDER NOTE - DIFFERENTIAL DIAGNOSIS
Rule out acute cholecystitis, diverticulitis/colitis, other intra-abdominal pathology, leukocytosis, electrolyte abnormality Differential Diagnosis

## 2024-11-24 NOTE — ED PROVIDER NOTE - PATIENT PORTAL LINK FT
You can access the FollowMyHealth Patient Portal offered by [facility] by registering at the following website: [email or web address]. By joining [facility]’s FollowMyHealth portal, you will also be able to view your health information using other applications (apps) compatible with our system.

## 2024-11-25 ENCOUNTER — APPOINTMENT (OUTPATIENT)
Dept: ENDOCRINOLOGY | Facility: CLINIC | Age: 34
End: 2024-11-25

## 2024-11-25 VITALS
RESPIRATION RATE: 16 BRPM | HEART RATE: 75 BPM | TEMPERATURE: 98 F | SYSTOLIC BLOOD PRESSURE: 114 MMHG | DIASTOLIC BLOOD PRESSURE: 76 MMHG | OXYGEN SATURATION: 100 %

## 2024-11-25 LAB
ALP SERPL-CCNC: 48 U/L — SIGNIFICANT CHANGE UP (ref 40–120)
APPEARANCE UR: CLEAR — SIGNIFICANT CHANGE UP
BACTERIA # UR AUTO: ABNORMAL /HPF
BILIRUB SERPL-MCNC: 0.2 MG/DL — SIGNIFICANT CHANGE UP (ref 0.2–1.2)
BILIRUB UR-MCNC: NEGATIVE — SIGNIFICANT CHANGE UP
COLOR SPEC: YELLOW — SIGNIFICANT CHANGE UP
DIFF PNL FLD: ABNORMAL
EPI CELLS # UR: PRESENT
GLUCOSE UR QL: NEGATIVE MG/DL — SIGNIFICANT CHANGE UP
HCG SERPL-ACNC: <1 MIU/ML — SIGNIFICANT CHANGE UP
KETONES UR-MCNC: NEGATIVE MG/DL — SIGNIFICANT CHANGE UP
LACTATE SERPL-SCNC: 1.1 MMOL/L — SIGNIFICANT CHANGE UP (ref 0.7–2)
LEUKOCYTE ESTERASE UR-ACNC: NEGATIVE — SIGNIFICANT CHANGE UP
NITRITE UR-MCNC: NEGATIVE — SIGNIFICANT CHANGE UP
PH UR: 6 — SIGNIFICANT CHANGE UP (ref 5–8)
PROT SERPL-MCNC: 7.2 G/DL — SIGNIFICANT CHANGE UP (ref 6–8.3)
PROT UR-MCNC: NEGATIVE MG/DL — SIGNIFICANT CHANGE UP
RBC CASTS # UR COMP ASSIST: 4 /HPF — SIGNIFICANT CHANGE UP (ref 0–4)
SP GR SPEC: 1.02 — SIGNIFICANT CHANGE UP (ref 1–1.03)
UROBILINOGEN FLD QL: 1 MG/DL — SIGNIFICANT CHANGE UP (ref 0.2–1)
WBC UR QL: 1 /HPF — SIGNIFICANT CHANGE UP (ref 0–5)

## 2024-11-25 PROCEDURE — 96374 THER/PROPH/DIAG INJ IV PUSH: CPT | Mod: XU

## 2024-11-25 PROCEDURE — 80053 COMPREHEN METABOLIC PANEL: CPT

## 2024-11-25 PROCEDURE — 83605 ASSAY OF LACTIC ACID: CPT

## 2024-11-25 PROCEDURE — 36415 COLL VENOUS BLD VENIPUNCTURE: CPT

## 2024-11-25 PROCEDURE — 76705 ECHO EXAM OF ABDOMEN: CPT | Mod: 26

## 2024-11-25 PROCEDURE — 85025 COMPLETE CBC W/AUTO DIFF WBC: CPT

## 2024-11-25 PROCEDURE — 99284 EMERGENCY DEPT VISIT MOD MDM: CPT | Mod: 25

## 2024-11-25 PROCEDURE — 74177 CT ABD & PELVIS W/CONTRAST: CPT | Mod: 26,MC

## 2024-11-25 PROCEDURE — 76705 ECHO EXAM OF ABDOMEN: CPT

## 2024-11-25 PROCEDURE — 84702 CHORIONIC GONADOTROPIN TEST: CPT

## 2024-11-25 PROCEDURE — 81001 URINALYSIS AUTO W/SCOPE: CPT

## 2024-11-25 PROCEDURE — 83690 ASSAY OF LIPASE: CPT

## 2024-11-25 PROCEDURE — 74177 CT ABD & PELVIS W/CONTRAST: CPT | Mod: MC

## 2024-11-25 RX ADMIN — SODIUM CHLORIDE 1000 MILLILITER(S): 9 INJECTION, SOLUTION INTRAMUSCULAR; INTRAVENOUS; SUBCUTANEOUS at 01:20

## 2024-12-30 NOTE — DISCUSSION/SUMMARY
History of vertigo and patient is presenting with worsening dizziness and altered mental status.   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   Neurology is following.  Currently on aspirin 81 mg daily and atorvastatin 40 mg daily.  Continue stroke pathway.  Follow-up TTE > small PFO  No events on telemetry. Discontinue,   PT OT evaluation recommending inpatient rehab.  Outpatient cardiology referral for Zio patch and evaluation of PFT   [FreeTextEntry1] : outreach effort made to reach patient in order to schedule appt, once availability is confirmed will werner florentino

## 2025-01-27 ENCOUNTER — APPOINTMENT (OUTPATIENT)
Dept: FAMILY MEDICINE | Facility: CLINIC | Age: 35
End: 2025-01-27

## 2025-04-21 ENCOUNTER — APPOINTMENT (OUTPATIENT)
Dept: MRI IMAGING | Facility: CLINIC | Age: 35
End: 2025-04-21
Payer: COMMERCIAL

## 2025-04-21 PROCEDURE — A9585: CPT

## 2025-04-21 PROCEDURE — 70553 MRI BRAIN STEM W/O & W/DYE: CPT

## 2025-04-24 ENCOUNTER — NON-APPOINTMENT (OUTPATIENT)
Age: 35
End: 2025-04-24

## 2025-06-03 ENCOUNTER — NON-APPOINTMENT (OUTPATIENT)
Age: 35
End: 2025-06-03

## 2025-06-17 ENCOUNTER — APPOINTMENT (OUTPATIENT)
Dept: ORTHOPEDIC SURGERY | Facility: CLINIC | Age: 35
End: 2025-06-17

## 2025-07-15 ENCOUNTER — APPOINTMENT (OUTPATIENT)
Dept: ORTHOPEDIC SURGERY | Facility: CLINIC | Age: 35
End: 2025-07-15
Payer: COMMERCIAL

## 2025-07-15 VITALS — BODY MASS INDEX: 25.4 KG/M2 | HEIGHT: 62 IN | WEIGHT: 138 LBS

## 2025-07-15 PROBLEM — S93.492A SPRAIN OF ANTERIOR TALOFIBULAR LIGAMENT OF LEFT ANKLE, INITIAL ENCOUNTER: Status: ACTIVE | Noted: 2025-07-15

## 2025-07-15 PROCEDURE — 99213 OFFICE O/P EST LOW 20 MIN: CPT

## 2025-09-09 ENCOUNTER — APPOINTMENT (OUTPATIENT)
Dept: INTERNAL MEDICINE | Facility: CLINIC | Age: 35
End: 2025-09-09
Payer: COMMERCIAL

## 2025-09-09 VITALS
HEART RATE: 79 BPM | DIASTOLIC BLOOD PRESSURE: 71 MMHG | BODY MASS INDEX: 27.07 KG/M2 | SYSTOLIC BLOOD PRESSURE: 102 MMHG | WEIGHT: 148 LBS

## 2025-09-09 DIAGNOSIS — R79.89 OTHER SPECIFIED ABNORMAL FINDINGS OF BLOOD CHEMISTRY: ICD-10-CM

## 2025-09-09 DIAGNOSIS — D35.2 BENIGN NEOPLASM OF PITUITARY GLAND: ICD-10-CM

## 2025-09-09 DIAGNOSIS — R73.09 OTHER ABNORMAL GLUCOSE: ICD-10-CM

## 2025-09-09 DIAGNOSIS — Z11.1 ENCOUNTER FOR SCREENING FOR RESPIRATORY TUBERCULOSIS: ICD-10-CM

## 2025-09-09 DIAGNOSIS — E78.5 HYPERLIPIDEMIA, UNSPECIFIED: ICD-10-CM

## 2025-09-09 PROCEDURE — 99214 OFFICE O/P EST MOD 30 MIN: CPT

## 2025-09-09 PROCEDURE — G2211 COMPLEX E/M VISIT ADD ON: CPT

## 2025-09-09 PROCEDURE — 36415 COLL VENOUS BLD VENIPUNCTURE: CPT

## 2025-09-12 LAB
BASOPHILS # BLD AUTO: 0.05 K/UL
BASOPHILS NFR BLD AUTO: 0.7 %
EOSINOPHIL # BLD AUTO: 0.17 K/UL
EOSINOPHIL NFR BLD AUTO: 2.3 %
ESTIMATED AVERAGE GLUCOSE: 94 MG/DL
GH SERPL-MCNC: 2.47 NG/ML
HBA1C MFR BLD HPLC: 4.9 %
HCT VFR BLD CALC: 43.4 %
HGB BLD-MCNC: 13.7 G/DL
IGF BP1 SERPL-MCNC: 228 NG/ML
IMM GRANULOCYTES NFR BLD AUTO: 0.3 %
LYMPHOCYTES # BLD AUTO: 2.05 K/UL
LYMPHOCYTES NFR BLD AUTO: 28.2 %
MAN DIFF?: NORMAL
MCHC RBC-ENTMCNC: 28.5 PG
MCHC RBC-ENTMCNC: 31.6 G/DL
MCV RBC AUTO: 90.4 FL
MONOCYTES # BLD AUTO: 0.49 K/UL
MONOCYTES NFR BLD AUTO: 6.7 %
NEUTROPHILS # BLD AUTO: 4.5 K/UL
NEUTROPHILS NFR BLD AUTO: 61.8 %
PLATELET # BLD AUTO: 266 K/UL
RBC # BLD: 4.8 M/UL
RBC # FLD: 12.9 %
WBC # FLD AUTO: 7.28 K/UL

## 2025-09-15 ENCOUNTER — APPOINTMENT (OUTPATIENT)
Dept: MAMMOGRAPHY | Facility: CLINIC | Age: 35
End: 2025-09-15

## 2025-09-15 LAB
M TB IFN-G BLD-IMP: NEGATIVE
QUANTIFERON TB PLUS MITOGEN MINUS NIL: 6.99 IU/ML
QUANTIFERON TB PLUS NIL: 0.04 IU/ML
QUANTIFERON TB PLUS TB1 MINUS NIL: 0.06 IU/ML
QUANTIFERON TB PLUS TB2 MINUS NIL: 0.01 IU/ML